# Patient Record
Sex: MALE | Race: WHITE | NOT HISPANIC OR LATINO | Employment: UNEMPLOYED | URBAN - METROPOLITAN AREA
[De-identification: names, ages, dates, MRNs, and addresses within clinical notes are randomized per-mention and may not be internally consistent; named-entity substitution may affect disease eponyms.]

---

## 2023-08-18 ENCOUNTER — HOSPITAL ENCOUNTER (EMERGENCY)
Facility: HOSPITAL | Age: 23
Discharge: HOME | End: 2023-08-18
Attending: FAMILY MEDICINE | Admitting: FAMILY MEDICINE
Payer: COMMERCIAL

## 2023-08-18 VITALS
TEMPERATURE: 98.4 F | OXYGEN SATURATION: 97 % | HEART RATE: 86 BPM | SYSTOLIC BLOOD PRESSURE: 113 MMHG | WEIGHT: 170 LBS | DIASTOLIC BLOOD PRESSURE: 53 MMHG | RESPIRATION RATE: 19 BRPM

## 2023-08-18 DIAGNOSIS — R11.2 NAUSEA VOMITING AND DIARRHEA: ICD-10-CM

## 2023-08-18 DIAGNOSIS — R19.7 NAUSEA VOMITING AND DIARRHEA: ICD-10-CM

## 2023-08-18 DIAGNOSIS — F10.20 UNCOMPLICATED ALCOHOL DEPENDENCE (H): ICD-10-CM

## 2023-08-18 LAB
ALBUMIN SERPL BCG-MCNC: 4.8 G/DL (ref 3.5–5.2)
ALP SERPL-CCNC: 91 U/L (ref 40–129)
ALT SERPL W P-5'-P-CCNC: 71 U/L (ref 0–70)
ANION GAP SERPL CALCULATED.3IONS-SCNC: 16 MMOL/L (ref 7–15)
AST SERPL W P-5'-P-CCNC: 46 U/L (ref 0–45)
B-OH-BUTYR SERPL-SCNC: 1.2 MMOL/L
BASE EXCESS BLDV CALC-SCNC: 3.5 MMOL/L
BASOPHILS # BLD AUTO: 0 10E3/UL (ref 0–0.2)
BASOPHILS NFR BLD AUTO: 0 %
BILIRUB DIRECT SERPL-MCNC: <0.2 MG/DL (ref 0–0.3)
BILIRUB SERPL-MCNC: 0.6 MG/DL
BUN SERPL-MCNC: 19.3 MG/DL (ref 6–20)
CALCIUM SERPL-MCNC: 9.4 MG/DL (ref 8.6–10)
CHLORIDE SERPL-SCNC: 96 MMOL/L (ref 98–107)
CREAT SERPL-MCNC: 0.74 MG/DL (ref 0.67–1.17)
DEPRECATED HCO3 PLAS-SCNC: 25 MMOL/L (ref 22–29)
EOSINOPHIL # BLD AUTO: 0 10E3/UL (ref 0–0.7)
EOSINOPHIL NFR BLD AUTO: 0 %
ERYTHROCYTE [DISTWIDTH] IN BLOOD BY AUTOMATED COUNT: 11.7 % (ref 10–15)
ETHANOL SERPL-MCNC: <0.01 G/DL
GFR SERPL CREATININE-BSD FRML MDRD: >90 ML/MIN/1.73M2
GLUCOSE SERPL-MCNC: 116 MG/DL (ref 70–99)
HCO3 BLDV-SCNC: 28 MMOL/L (ref 24–30)
HCT VFR BLD AUTO: 39.3 % (ref 40–53)
HGB BLD-MCNC: 13.7 G/DL (ref 13.3–17.7)
IMM GRANULOCYTES # BLD: 0 10E3/UL
IMM GRANULOCYTES NFR BLD: 0 %
LIPASE SERPL-CCNC: 16 U/L (ref 13–60)
LYMPHOCYTES # BLD AUTO: 0.6 10E3/UL (ref 0.8–5.3)
LYMPHOCYTES NFR BLD AUTO: 7 %
MCH RBC QN AUTO: 30.9 PG (ref 26.5–33)
MCHC RBC AUTO-ENTMCNC: 34.9 G/DL (ref 31.5–36.5)
MCV RBC AUTO: 89 FL (ref 78–100)
MONOCYTES # BLD AUTO: 0.5 10E3/UL (ref 0–1.3)
MONOCYTES NFR BLD AUTO: 6 %
NEUTROPHILS # BLD AUTO: 7.4 10E3/UL (ref 1.6–8.3)
NEUTROPHILS NFR BLD AUTO: 87 %
NRBC # BLD AUTO: 0 10E3/UL
NRBC BLD AUTO-RTO: 0 /100
OXYHGB MFR BLDV: 84.1 % (ref 70–75)
PCO2 BLDV: 40 MM HG (ref 35–50)
PH BLDV: 7.44 [PH] (ref 7.35–7.45)
PLATELET # BLD AUTO: 234 10E3/UL (ref 150–450)
PO2 BLDV: 48 MM HG (ref 25–47)
POTASSIUM SERPL-SCNC: 3.7 MMOL/L (ref 3.4–5.3)
PROT SERPL-MCNC: 7.6 G/DL (ref 6.4–8.3)
RBC # BLD AUTO: 4.43 10E6/UL (ref 4.4–5.9)
SAO2 % BLDV: 84.9 % (ref 70–75)
SODIUM SERPL-SCNC: 137 MMOL/L (ref 136–145)
WBC # BLD AUTO: 8.5 10E3/UL (ref 4–11)

## 2023-08-18 PROCEDURE — 250N000011 HC RX IP 250 OP 636: Mod: JZ | Performed by: FAMILY MEDICINE

## 2023-08-18 PROCEDURE — 258N000003 HC RX IP 258 OP 636: Performed by: FAMILY MEDICINE

## 2023-08-18 PROCEDURE — 83930 ASSAY OF BLOOD OSMOLALITY: CPT | Performed by: FAMILY MEDICINE

## 2023-08-18 PROCEDURE — 82077 ASSAY SPEC XCP UR&BREATH IA: CPT | Performed by: FAMILY MEDICINE

## 2023-08-18 PROCEDURE — 83690 ASSAY OF LIPASE: CPT | Performed by: FAMILY MEDICINE

## 2023-08-18 PROCEDURE — 96374 THER/PROPH/DIAG INJ IV PUSH: CPT

## 2023-08-18 PROCEDURE — 96361 HYDRATE IV INFUSION ADD-ON: CPT

## 2023-08-18 PROCEDURE — 36415 COLL VENOUS BLD VENIPUNCTURE: CPT | Performed by: FAMILY MEDICINE

## 2023-08-18 PROCEDURE — 82248 BILIRUBIN DIRECT: CPT | Performed by: FAMILY MEDICINE

## 2023-08-18 PROCEDURE — 82805 BLOOD GASES W/O2 SATURATION: CPT | Performed by: FAMILY MEDICINE

## 2023-08-18 PROCEDURE — 85025 COMPLETE CBC W/AUTO DIFF WBC: CPT | Performed by: FAMILY MEDICINE

## 2023-08-18 PROCEDURE — 99284 EMERGENCY DEPT VISIT MOD MDM: CPT | Mod: 25

## 2023-08-18 PROCEDURE — C9113 INJ PANTOPRAZOLE SODIUM, VIA: HCPCS | Mod: JZ | Performed by: FAMILY MEDICINE

## 2023-08-18 PROCEDURE — 96375 TX/PRO/DX INJ NEW DRUG ADDON: CPT

## 2023-08-18 PROCEDURE — 93005 ELECTROCARDIOGRAM TRACING: CPT | Performed by: FAMILY MEDICINE

## 2023-08-18 PROCEDURE — 82010 KETONE BODYS QUAN: CPT | Performed by: FAMILY MEDICINE

## 2023-08-18 RX ORDER — ONDANSETRON 2 MG/ML
4 INJECTION INTRAMUSCULAR; INTRAVENOUS ONCE
Status: COMPLETED | OUTPATIENT
Start: 2023-08-18 | End: 2023-08-18

## 2023-08-18 RX ADMIN — ONDANSETRON 4 MG: 2 INJECTION INTRAMUSCULAR; INTRAVENOUS at 22:55

## 2023-08-18 RX ADMIN — SODIUM CHLORIDE 1000 ML: 9 INJECTION, SOLUTION INTRAVENOUS at 22:55

## 2023-08-18 RX ADMIN — PANTOPRAZOLE SODIUM 40 MG: 40 INJECTION, POWDER, FOR SOLUTION INTRAVENOUS at 23:26

## 2023-08-18 ASSESSMENT — ACTIVITIES OF DAILY LIVING (ADL): ADLS_ACUITY_SCORE: 35

## 2023-08-18 ASSESSMENT — ENCOUNTER SYMPTOMS
VOMITING: 1
DIARRHEA: 1
ABDOMINAL PAIN: 1
NAUSEA: 1

## 2023-08-19 LAB — OSMOLALITY SERPL: 282 MMOL/KG (ref 275–295)

## 2023-08-19 NOTE — ED PROVIDER NOTES
EMERGENCY DEPARTMENT ENCOUNTER      NAME: Alex Carvajal  AGE: 23 year old male  YOB: 2000  MRN: [id number]  EVALUATION DATE & TIME: 8/18/2023  9:47 PM    PCP: No primary care provider on file.    ED PROVIDER: Bola Bernardo M.D.    Chief Complaint   Patient presents with    Nausea, Vomiting, & Diarrhea       FINAL IMPRESSION:  1. Nausea vomiting and diarrhea    2. Uncomplicated alcohol dependence (H)        ED COURSE & MEDICAL DECISION MAKING:    Pertinent Labs & Imaging studies independently interpreted by me. (See chart for details)  9:56 PM  Patient seen and examined, reviewed prior visit of earlier today at outside facility for same symptoms.  At that time patient was discharged with capsaicin cream as well as Zofran, at that time no labs were done and patient was treated symptomatically.  Patient represents tonight with vomiting again this evening.  Concern for possible withdrawal although last drink was over 2 hours ago.  He is tachycardic on initial arrival, blood pressure reassuring.  Mild diffuse abdominal tenderness.  No tremor, tongue fasciculations, or diaphoresis.  Labs and fluids are ordered as well as Zofran.  11:09 PM labs independently interpreted by me with reassuring basic panel, normal venous blood gas, slight elevation of the ketones likely related to vomiting and dehydration, unlikely to be related to isopropyl alcohol ingestion.  Slight elevation in the LFTs consistent with chronic alcohol dependence but alcohol level today is negative.  Patient reports his last drink was 3 days ago, no evidence for delirium tremens or severe alcohol withdrawal, diarrhea overall likely related to gastrointestinal illness.  No severe ketonemia, no evidence for acute intoxication, acute isopropyl alcohol intoxication is unlikely.    At the conclusion of the encounter I discussed the results of all of the tests and the disposition. The questions were answered. The patient or family  acknowledged understanding and was agreeable with the care plan.     Medical Decision Making    History:  Supplemental history from: Documented in chart, if applicable  External Record(s) reviewed: Documented in chart, if applicable. and Other: ED visit from earlier today    Work Up:  Chart documentation includes differential considered and any EKGs or imaging independently interpreted by provider, where specified.  In additional to work up documented, I considered the following work up: Documented in chart, if applicable.    External consultation:  Discussion of management with another provider: Documented in chart, if applicable    Complicating factors:  Care impacted by chronic illness: Mental Health and Smoking / Nicotine Use  Care affected by social determinants of health: Alcohol Abuse and/or Recreational Drug Use    Disposition considerations: Discharge. I recommended the patient continue their current prescription strength medication(s): Zofran 4 mg, can take every 6 hours. See documentation for any additional details.      MEDICATIONS GIVEN IN THE EMERGENCY:  Medications   0.9% sodium chloride BOLUS (1,000 mLs Intravenous $New Bag 8/18/23 2255)   pantoprazole (PROTONIX) IV push injection 40 mg (has no administration in time range)   ondansetron (ZOFRAN) injection 4 mg (4 mg Intravenous $Given 8/18/23 2255)       NEW PRESCRIPTIONS STARTED AT TODAY'S ER VISIT  New Prescriptions    No medications on file       =================================================================    HPI    Patient information was obtained from: Patient       Alex Carvajal is a 23 year old male with a pertinent history of asthma, anxiety, depression, alcohol use, and marijuana use who presents to this ED via walk-in for evaluation of nausea, vomiting, diarrhea     Per chart review: The patient was seen at [facility] earlier today for nausea, vomiting, diarrhea. The patient reported daily marijuana use and that he recently  "checked into a marijuana treatment facility so he has not used it recently. Patient discharged with prescription for capsaicin cream and Zofran.     The patient reports \"a lot of nausea and really consistent vomiting\" today since around 9 AM. The patient reports he has only been able to drink water and Sprite. The patient was seen earlier today (see chart review) and was feeling better with capsaicin cream and Zofran, but was told to come back to the ED by staff at his sober facility wanted him to be seen again. The patient also reports diarrhea, but notes it resolved this evening. The patient also reports some diffuse abdominal pain from vomiting. The patient denies any other symptoms or complaints.     The patient notes that he has been drinking alcohol more frequently lately and will mix isopropyl alcohol with water and drink that when he is having a hard day. The patient notes he last drank on the 15th.     REVIEW OF SYSTEMS   Review of Systems   Gastrointestinal:  Positive for abdominal pain, diarrhea, nausea and vomiting.   All other systems reviewed and are negative.     All other systems reviewed and negative    PAST MEDICAL HISTORY:  No past medical history on file.    PAST SURGICAL HISTORY:  No past surgical history on file.    CURRENT MEDICATIONS:    Current Facility-Administered Medications   Medication    0.9% sodium chloride BOLUS    pantoprazole (PROTONIX) IV push injection 40 mg     No current outpatient medications on file.       ALLERGIES:  No Known Allergies    FAMILY HISTORY:  No family history on file.    SOCIAL HISTORY:   Social History     Socioeconomic History    Marital status: Single       VITALS:  /54   Pulse 68   Temp 98.4  F (36.9  C) (Oral)   Resp 22   Wt 77.1 kg (170 lb)   SpO2 96%     PHYSICAL EXAM:  Physical Exam  Vitals and nursing note reviewed.   Constitutional:       Appearance: Normal appearance.   HENT:      Head: Normocephalic and atraumatic.      Right Ear: " External ear normal.      Left Ear: External ear normal.      Nose: Nose normal.      Mouth/Throat:      Mouth: Mucous membranes are moist.   Eyes:      Extraocular Movements: Extraocular movements intact.      Conjunctiva/sclera: Conjunctivae normal.      Pupils: Pupils are equal, round, and reactive to light.   Cardiovascular:      Rate and Rhythm: Normal rate and regular rhythm.   Pulmonary:      Effort: Pulmonary effort is normal.      Breath sounds: Normal breath sounds. No wheezing or rales.   Abdominal:      General: Abdomen is flat. There is no distension.      Palpations: Abdomen is soft.      Tenderness: There is abdominal tenderness (mild, diffuse). There is no guarding.   Musculoskeletal:         General: Normal range of motion.      Cervical back: Normal range of motion and neck supple.      Right lower leg: No edema.      Left lower leg: No edema.   Lymphadenopathy:      Cervical: No cervical adenopathy.   Skin:     General: Skin is warm and dry.   Neurological:      General: No focal deficit present.      Mental Status: He is alert and oriented to person, place, and time. Mental status is at baseline.      Comments: No gross focal neurologic deficits   Psychiatric:         Mood and Affect: Mood normal.         Behavior: Behavior normal.         Thought Content: Thought content normal.          LAB:  All pertinent labs reviewed and interpreted.  Results for orders placed or performed during the hospital encounter of 08/18/23   Basic metabolic panel   Result Value Ref Range    Sodium 137 136 - 145 mmol/L    Potassium 3.7 3.4 - 5.3 mmol/L    Chloride 96 (L) 98 - 107 mmol/L    Carbon Dioxide (CO2) 25 22 - 29 mmol/L    Anion Gap 16 (H) 7 - 15 mmol/L    Urea Nitrogen 19.3 6.0 - 20.0 mg/dL    Creatinine 0.74 0.67 - 1.17 mg/dL    Calcium 9.4 8.6 - 10.0 mg/dL    Glucose 116 (H) 70 - 99 mg/dL    GFR Estimate >90 >60 mL/min/1.73m2   Hepatic function panel   Result Value Ref Range    Protein Total 7.6 6.4 - 8.3  g/dL    Albumin 4.8 3.5 - 5.2 g/dL    Bilirubin Total 0.6 <=1.2 mg/dL    Alkaline Phosphatase 91 40 - 129 U/L    AST 46 (H) 0 - 45 U/L    ALT 71 (H) 0 - 70 U/L    Bilirubin Direct <0.20 0.00 - 0.30 mg/dL   Result Value Ref Range    Lipase 16 13 - 60 U/L   Ethyl Alcohol Level   Result Value Ref Range    Alcohol ethyl <0.01 <=0.01 g/dL   Blood gas venous   Result Value Ref Range    pH Venous 7.44 7.35 - 7.45    pCO2 Venous 40 35 - 50 mm Hg    pO2 Venous 48 (H) 25 - 47 mm Hg    Bicarbonate Venous 28 24 - 30 mmol/L    Base Excess/Deficit (+/-) 3.5   mmol/L    Oxyhemoglobin Venous 84.1 (H) 70.0 - 75.0 %    O2 Sat, Venous 84.9 (H) 70.0 - 75.0 %   Ketone Beta-Hydroxybutyrate Quantitative   Result Value Ref Range    Ketone (Beta-Hydroxybutyrate) Quantitative 1.20 (H) <=0.30 mmol/L   CBC with platelets and differential   Result Value Ref Range    WBC Count 8.5 4.0 - 11.0 10e3/uL    RBC Count 4.43 4.40 - 5.90 10e6/uL    Hemoglobin 13.7 13.3 - 17.7 g/dL    Hematocrit 39.3 (L) 40.0 - 53.0 %    MCV 89 78 - 100 fL    MCH 30.9 26.5 - 33.0 pg    MCHC 34.9 31.5 - 36.5 g/dL    RDW 11.7 10.0 - 15.0 %    Platelet Count 234 150 - 450 10e3/uL    % Neutrophils 87 %    % Lymphocytes 7 %    % Monocytes 6 %    % Eosinophils 0 %    % Basophils 0 %    % Immature Granulocytes 0 %    NRBCs per 100 WBC 0 <1 /100    Absolute Neutrophils 7.4 1.6 - 8.3 10e3/uL    Absolute Lymphocytes 0.6 (L) 0.8 - 5.3 10e3/uL    Absolute Monocytes 0.5 0.0 - 1.3 10e3/uL    Absolute Eosinophils 0.0 0.0 - 0.7 10e3/uL    Absolute Basophils 0.0 0.0 - 0.2 10e3/uL    Absolute Immature Granulocytes 0.0 <=0.4 10e3/uL    Absolute NRBCs 0.0 10e3/uL       RADIOLOGY:  Reviewed all pertinent imaging. Please see official radiology report.  No orders to display         Bola Bernardo M.D.  Emergency Medicine  [facility] EMERGENCY DEPARTMENT  [address] [place] [phone]  Dept: [phone]        Bola Bernardo MD  08/18/23 0173

## 2023-08-19 NOTE — ED TRIAGE NOTES
Pt c/o nausea that started from 9am to 6pm, vomiting every 15 min since 930am to 4pm, diarrhea from 930a to 1pm. Pt able to keep water in. Per pt he smokes marijuana everyday, vap 1/3 of a gram of cannabis wax, last use was yesterday. Pt last alcohol drink was 10pm on 8/15. When pt can not get alcohol for a drink he adds water to rubbing alcohol and drink it. Per pt he drinks alcohol 4 days a week and smoke marijuana 6 days a week. Pt is living in a [facility] [place] [facility]. Pt is here with a staff from the residency.     Triage Assessment       Row Name 08/18/23 7134       Triage Assessment (Adult)    Airway WDL WDL       Respiratory WDL    Respiratory WDL WDL       Skin Circulation/Temperature WDL    Skin Circulation/Temperature WDL WDL       Cardiac WDL    Cardiac WDL WDL       Peripheral/Neurovascular WDL    Peripheral Neurovascular WDL WDL       Cognitive/Neuro/Behavioral WDL    Cognitive/Neuro/Behavioral WDL WDL

## 2023-08-21 LAB
ATRIAL RATE - MUSE: 68 BPM
DIASTOLIC BLOOD PRESSURE - MUSE: NORMAL MMHG
INTERPRETATION ECG - MUSE: NORMAL
P AXIS - MUSE: 67 DEGREES
PR INTERVAL - MUSE: 132 MS
QRS DURATION - MUSE: 100 MS
QT - MUSE: 404 MS
QTC - MUSE: 429 MS
R AXIS - MUSE: 61 DEGREES
SYSTOLIC BLOOD PRESSURE - MUSE: NORMAL MMHG
T AXIS - MUSE: 46 DEGREES
VENTRICULAR RATE- MUSE: 68 BPM

## 2023-11-11 ENCOUNTER — HEALTH MAINTENANCE LETTER (OUTPATIENT)
Age: 23
End: 2023-11-11

## 2024-01-31 ENCOUNTER — HOSPITAL ENCOUNTER (EMERGENCY)
Facility: CLINIC | Age: 24
Discharge: LEFT BEFORE DISCHARGE | End: 2024-01-31
Admitting: EMERGENCY MEDICINE
Payer: COMMERCIAL

## 2024-01-31 VITALS
SYSTOLIC BLOOD PRESSURE: 124 MMHG | HEART RATE: 116 BPM | TEMPERATURE: 98.5 F | WEIGHT: 185 LBS | OXYGEN SATURATION: 99 % | HEIGHT: 67 IN | BODY MASS INDEX: 29.03 KG/M2 | RESPIRATION RATE: 22 BRPM | DIASTOLIC BLOOD PRESSURE: 71 MMHG

## 2024-01-31 LAB
ALBUMIN SERPL BCG-MCNC: 5.3 G/DL (ref 3.5–5.2)
ALP SERPL-CCNC: 109 U/L (ref 40–150)
ALT SERPL W P-5'-P-CCNC: 33 U/L (ref 0–70)
ANION GAP SERPL CALCULATED.3IONS-SCNC: 19 MMOL/L (ref 7–15)
AST SERPL W P-5'-P-CCNC: 23 U/L (ref 0–45)
BASOPHILS # BLD AUTO: ABNORMAL 10*3/UL
BASOPHILS # BLD MANUAL: 0 10E3/UL (ref 0–0.2)
BASOPHILS NFR BLD AUTO: ABNORMAL %
BASOPHILS NFR BLD MANUAL: 0 %
BILIRUB SERPL-MCNC: 0.4 MG/DL
BUN SERPL-MCNC: 15 MG/DL (ref 6–20)
CALCIUM SERPL-MCNC: 10.3 MG/DL (ref 8.6–10)
CHLORIDE SERPL-SCNC: 100 MMOL/L (ref 98–107)
CREAT SERPL-MCNC: 1.05 MG/DL (ref 0.67–1.17)
DEPRECATED HCO3 PLAS-SCNC: 23 MMOL/L (ref 22–29)
EGFRCR SERPLBLD CKD-EPI 2021: >90 ML/MIN/1.73M2
EOSINOPHIL # BLD AUTO: ABNORMAL 10*3/UL
EOSINOPHIL # BLD MANUAL: 0 10E3/UL (ref 0–0.7)
EOSINOPHIL NFR BLD AUTO: ABNORMAL %
EOSINOPHIL NFR BLD MANUAL: 0 %
ERYTHROCYTE [DISTWIDTH] IN BLOOD BY AUTOMATED COUNT: 12.2 % (ref 10–15)
GLUCOSE SERPL-MCNC: 170 MG/DL (ref 70–99)
HCT VFR BLD AUTO: 45.4 % (ref 40–53)
HGB BLD-MCNC: 15.7 G/DL (ref 13.3–17.7)
IMM GRANULOCYTES # BLD: ABNORMAL 10*3/UL
IMM GRANULOCYTES NFR BLD: ABNORMAL %
LIPASE SERPL-CCNC: 11 U/L (ref 13–60)
LYMPHOCYTES # BLD AUTO: ABNORMAL 10*3/UL
LYMPHOCYTES # BLD MANUAL: 0.9 10E3/UL (ref 0.8–5.3)
LYMPHOCYTES NFR BLD AUTO: ABNORMAL %
LYMPHOCYTES NFR BLD MANUAL: 4 %
MCH RBC QN AUTO: 30.4 PG (ref 26.5–33)
MCHC RBC AUTO-ENTMCNC: 34.6 G/DL (ref 31.5–36.5)
MCV RBC AUTO: 88 FL (ref 78–100)
MONOCYTES # BLD AUTO: ABNORMAL 10*3/UL
MONOCYTES # BLD MANUAL: 1.3 10E3/UL (ref 0–1.3)
MONOCYTES NFR BLD AUTO: ABNORMAL %
MONOCYTES NFR BLD MANUAL: 6 %
NEUTROPHILS # BLD AUTO: ABNORMAL 10*3/UL
NEUTROPHILS # BLD MANUAL: 20 10E3/UL (ref 1.6–8.3)
NEUTROPHILS NFR BLD AUTO: ABNORMAL %
NEUTROPHILS NFR BLD MANUAL: 90 %
NRBC # BLD AUTO: 0 10E3/UL
NRBC BLD AUTO-RTO: 0 /100
PLAT MORPH BLD: ABNORMAL
PLATELET # BLD AUTO: 404 10E3/UL (ref 150–450)
POTASSIUM SERPL-SCNC: 3.9 MMOL/L (ref 3.4–5.3)
PROT SERPL-MCNC: 9.1 G/DL (ref 6.4–8.3)
RBC # BLD AUTO: 5.16 10E6/UL (ref 4.4–5.9)
RBC MORPH BLD: ABNORMAL
SODIUM SERPL-SCNC: 142 MMOL/L (ref 135–145)
WBC # BLD AUTO: 22.2 10E3/UL (ref 4–11)

## 2024-01-31 PROCEDURE — 85027 COMPLETE CBC AUTOMATED: CPT | Performed by: EMERGENCY MEDICINE

## 2024-01-31 PROCEDURE — 83690 ASSAY OF LIPASE: CPT | Performed by: EMERGENCY MEDICINE

## 2024-01-31 PROCEDURE — 82040 ASSAY OF SERUM ALBUMIN: CPT | Performed by: EMERGENCY MEDICINE

## 2024-01-31 PROCEDURE — 99281 EMR DPT VST MAYX REQ PHY/QHP: CPT | Mod: 25

## 2024-01-31 PROCEDURE — 36415 COLL VENOUS BLD VENIPUNCTURE: CPT | Performed by: EMERGENCY MEDICINE

## 2024-01-31 PROCEDURE — 250N000011 HC RX IP 250 OP 636: Performed by: EMERGENCY MEDICINE

## 2024-01-31 PROCEDURE — 258N000003 HC RX IP 258 OP 636: Performed by: EMERGENCY MEDICINE

## 2024-01-31 PROCEDURE — 96374 THER/PROPH/DIAG INJ IV PUSH: CPT

## 2024-01-31 PROCEDURE — 85007 BL SMEAR W/DIFF WBC COUNT: CPT | Performed by: EMERGENCY MEDICINE

## 2024-01-31 RX ORDER — ONDANSETRON 2 MG/ML
4 INJECTION INTRAMUSCULAR; INTRAVENOUS ONCE
Status: COMPLETED | OUTPATIENT
Start: 2024-01-31 | End: 2024-01-31

## 2024-01-31 RX ADMIN — ONDANSETRON 4 MG: 2 INJECTION INTRAMUSCULAR; INTRAVENOUS at 17:40

## 2024-01-31 RX ADMIN — SODIUM CHLORIDE 1000 ML: 9 INJECTION, SOLUTION INTRAVENOUS at 17:40

## 2024-01-31 ASSESSMENT — ACTIVITIES OF DAILY LIVING (ADL): ADLS_ACUITY_SCORE: 35

## 2024-01-31 NOTE — ED TRIAGE NOTES
Had a vomiting episode last night at midnight then started vomiting this morning and hasn't stopped.  Here with dad who states seems his vomit looks like its dark or blood tinged.  Pt denies alcohol use.  States occ uses ibuprofen.    Reports gen ab pain started after the vomiting.   States does use cannabis and has had this before.  Vaped yesterday afternoon.      Triage Assessment (Adult)       Row Name 01/31/24 8525          Triage Assessment    Airway WDL WDL        Respiratory WDL    Respiratory WDL WDL        Skin Circulation/Temperature WDL    Skin Circulation/Temperature WDL X        Cardiac WDL    Cardiac WDL WDL        Peripheral/Neurovascular WDL    Peripheral Neurovascular WDL WDL        Cognitive/Neuro/Behavioral WDL    Cognitive/Neuro/Behavioral WDL WDL

## 2024-02-21 ENCOUNTER — HOSPITAL ENCOUNTER (EMERGENCY)
Facility: CLINIC | Age: 24
Discharge: HOME | End: 2024-02-21
Attending: EMERGENCY MEDICINE | Admitting: EMERGENCY MEDICINE
Payer: COMMERCIAL

## 2024-02-21 VITALS
DIASTOLIC BLOOD PRESSURE: 45 MMHG | HEART RATE: 76 BPM | HEIGHT: 67 IN | TEMPERATURE: 98.6 F | OXYGEN SATURATION: 100 % | RESPIRATION RATE: 16 BRPM | SYSTOLIC BLOOD PRESSURE: 128 MMHG | WEIGHT: 188 LBS | BODY MASS INDEX: 29.51 KG/M2

## 2024-02-21 DIAGNOSIS — F41.9 ANXIETY: ICD-10-CM

## 2024-02-21 PROBLEM — F33.1 MAJOR DEPRESSIVE DISORDER, RECURRENT EPISODE, MODERATE (H): Status: ACTIVE | Noted: 2024-02-21

## 2024-02-21 PROBLEM — F41.1 GENERALIZED ANXIETY DISORDER: Status: ACTIVE | Noted: 2024-02-21

## 2024-02-21 PROCEDURE — 99282 EMERGENCY DEPT VISIT SF MDM: CPT

## 2024-02-21 ASSESSMENT — COLUMBIA-SUICIDE SEVERITY RATING SCALE - C-SSRS
6. HAVE YOU EVER DONE ANYTHING, STARTED TO DO ANYTHING, OR PREPARED TO DO ANYTHING TO END YOUR LIFE?: NO
1. IN THE PAST MONTH, HAVE YOU WISHED YOU WERE DEAD OR WISHED YOU COULD GO TO SLEEP AND NOT WAKE UP?: YES
2. HAVE YOU ACTUALLY HAD ANY THOUGHTS OF KILLING YOURSELF IN THE PAST MONTH?: NO

## 2024-02-21 ASSESSMENT — ACTIVITIES OF DAILY LIVING (ADL)
ADLS_ACUITY_SCORE: 35
ADLS_ACUITY_SCORE: 35

## 2024-02-21 NOTE — ED TRIAGE NOTES
"\"Physical anxiety but better since on selective serotonin reuptake inhibitor, however he feels he needs something strong than what he's getting\"        "

## 2024-02-21 NOTE — ED NOTES
"On assessment pt stated to writer \"My anxiety has been worse the past couple days with mor like somatic symptoms.  I called the doctor for a prescription for clonidine and it worked well for me.  They wouldn't do it. My goal is to get medication to help.\" Denies SI or HI  "

## 2024-02-21 NOTE — DISCHARGE INSTRUCTIONS
If I am feeling unsafe or I am in a c risis, I will:   Contact my established care providers   Call the National Suicide Prevention Lifeline: [phone]   Go to the nearest emergency room   Call 911          Warning signs that I or other people might notice when a crisis is developing for me: My anxiety is becoming more unmanageable.    Things I am able to do on my own to cope or help me feel better: Engage in relaxation techniques: deep/square breathing exercises, visulalization.    Things that I am able to do with others to cope or help me better: Spend quality time with family and friends.     Things I can use or do for distraction: Movies, TV, music, hobbies, games...    Changes I can make to support my mental health and wellness: Engage in provider suggested anxiety management to augment the medications you are prescribed.    People in my life that I can ask for help: Parents, providers.    Your [facility] has a mental health crisis team you can call 24/7: [facility]: [phone]    Other things that are important when I m in crisis: I am not alone, I have resources, just ask!!!![facility] SCHEDULING:  Today you were seen by a licensed mental health professional through [facility] and [facility] ([facility])  for a crisis assessment in the Emergency Department at [facility].  It is recommended that you follow up with your estabished providers (psychiatrist, mental health therapist, and/or primary care doctor - as relevant) as soon as possible. Coordinators from [facility] will be calling you in the next 24-48 hours to ensure that you have the resources you need.  You can also contact [facility] coordinators directly at [phone].    You have been scheduled the following appointments:     Date: Tuesday, 2/27/2024  Time: 2:30 pm - 3:30 pm  Provider: Kamille JIMENEZ  CNP,RN  Location: [facility], [address] [place] [address], [facility] [address], [place] [address]  Phone: [phone]  Type: Medication Mgmt - Initial (In-Person)    Scheduling Instructions  NO VIRTUAL FOR AGES UNDER 18 EMAIL IS REQUIRED TO SCHEDULE FOR ALL APPTS NEW PATIENT PAPERWORK WILL NEED TO BE COMPLETED & RETURNED 24HRS IN ADVANCE. CORRECT INSURANCE AND PHONE NUMBER TO SCHEDULE AN APPT. Kamille accepts ages 15yrs-72yrs- NO THERAPY!!! NO EXCEPTIONS!!! NO DEMENTIA, ALZHEIMER'S OR PARKINSON'S! Patients must live in [place].  Patient Instructions  NO VIRTUAL FOR AGES UNDER 18 EMAIL IS REQUIRED TO SCHEDULE FOR ALL APPTS NEW PATIENT PAPERWORK WILL NEED TO BE COMPLETED & RETURNED 24HRS IN ADVANCE. CORRECT INSURANCE AND PHONE NUMBER TO SCHEDULE AN APPT. Kamille accepts ages 15yrs-72yrs- NO THERAPY!!! NO EXCEPTIONS!!! NO DEMENTIA, ALZHEIMER'S OR PARKINSON'S! Patients must live in [place].      [facility] maintains an extensive network of licensed behavioral health providers to connect patients with the services they need.  We do not charge providers a fee to participate in our referral network.  We match patients with providers based on a patient s specific needs, insurance coverage, and location.  Our first effort will be to refer you to a provider within your care system, and will utilize providers outside your care system as needed.

## 2024-02-21 NOTE — ED PROVIDER NOTES
"  History     Chief Complaint:  Anxiety       HPI   Alex Carvajal is a 23 year old male who presents for evaluation of anxiety. He felt physical anxiety symptoms of shortness of breath and chest tightness, which is typical for him to experience several times a week. His psychiatrist recently reduced the dosages of some of his medications, and he is unsure if this is causing this. His primary request is having \"heavier\" medications prescribed such as benzo's or stimulants, as he is 21 days sober from THC and 3.5 months sober from alcohol, and fears without these stronger medications he will relapse. He requested this to be done by his psychiatrist who declined. He denies any physical complaints.      Independent Historian:   None - Patient Only    Review of External Notes:   I reviewed the PDMP.  The patient receives regular prescriptions for gabapentin.  No other narcotic prescriptions were seen in the database over the last 12 months.    Medications:    Acetaminophen  Albuterol inhaled  Aripiprazole  Capsaicin  cyclobenzaprine  Diclofenac  Fluorouracil  Fluticasone inhaled  Gabapentin  Guaifenesin ER  Hydroxyzine  Ibuprofen   lidocaine ointment  Melatonin  Montelukast  Naltrexone  Naproxen  Ondansetron  Propranolol  Quetiapine  Salicylic acid liquid  Sertraline  Sumatriptan  Terbinafine  Tizanidine    Past Medical History:    Anxiety    Past Surgical History:    No past surgical history on file.     Physical Exam     Patient Vitals for the past 24 hrs:   BP Temp Temp src Pulse Resp SpO2 Height Weight   02/21/24 1116 128/45 98.6  F (37  C) Temporal 76 16 100 % 1.702 m (5' 7\") 85.3 kg (188 lb)        Physical Exam  General: Alert, No distress. Nontoxic appearance  Head: No signs of trauma.   Mouth/Throat: Oropharynx moist.   Eyes: Conjunctivae are normal. Pupils are equal..   Neck: Normal range of motion.    CV: Appears well perfused.  Resp:No respiratory distress.   MSK: Normal range of motion. No obvious " deformity.   Neuro: The patient is alert and interactive. VILLARREAL. Speech normal. GCS 15  Skin: No lesion or sign of trauma noted.   Psych: normal mood and affect. behavior is normal.       Emergency Department Course     Emergency Department Course & Assessments:    Interventions:  Medications - No data to display     Independent Interpretation (X-rays, CTs, rhythm strip):  None    Assessments/Consultations/Discussion of Management or Tests:  I consulted with the DEC .  See their notes for details.    Social Determinants of Health affecting care:   Healthcare Access/Compliance and Stress/Adjustment Disorders    Disposition:  The patient was discharged to home.     Impression & Plan        Medical Decision Making:  Alex Carvajal is a 23 year old male who presents for evaluation of anxiety.  There is a history of anxiety in the past and they are on medications.    There is no signs at this point of a general medical problem causing anxiety (PE, hyperthyroidism, other metabolic derangement, infection, etc).  There are no signs of serious decompensation warranting psychiatric hospitalization or 72 hold (no suicidal or homicidal ideation, no danger to self).    The patient was evaluated by the DEC staff.  It is determined that the patient does not need his medications adjusted and the way he would like.  The patient had been evaluated by his primary psychiatrist earlier in the day.  He did not receive the medications he would like to have started.  He should not be started on benzodiazepines or stimulants from the ER.    He was given a referral by the DEC staff to obtain an outpatient review of his medications.    Diagnosis:    ICD-10-CM    1. Anxiety  F41.9            Discharge Medications:  New Prescriptions    No medications on file          Scribe Disclosure:  Harrison CABRALES, am serving as a scribe at 1:01 PM on 2/21/2024 to document services personally performed by Carlton Del Castillo MD based on  my observations and the provider's statements to me.        Carlton Del Castillo MD  02/21/24 5093

## 2024-02-22 NOTE — CONSULTS
Diagnostic Evaluation Consultation  Crisis Assessment    Patient Name: Alex Carvajal  Age:  23 year old  Legal Sex: male  Gender Identity: male  Pronouns:   Race: White  Ethnicity: Not  or   Language: English      Patient was assessed: In person      Patient location: [facility] EMERGENCY DEPT                                 Referral Data and Chief Complaint  Alex Carvajal presents to the ED by  self. Patient is presenting to the ED for the following concerns: Depression, Anxiety.   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      Informed Consent and Assessment Methods  Explained the crisis assessment process, including applicable information disclosures and limits to confidentiality, assessed understanding of the process, and obtained consent to proceed with the assessment.  Assessment methods included conducting a formal interview with patient, review of medical records, collaboration with medical staff, and obtaining relevant collateral information from family and community providers when available.  : done     Patient response to interventions: acceptance expressed, verbalizes understanding, eager to participate  Coping skills were attempted to reduce the crisis:  Self advocating.     History of the Crisis   Patient presents to [facility] ED on his own after presenting to [facility] this morning seeking Cloidine and Klonazapam for anxiety issues. patient has prior diagnosis of MDD and JONATHAN. Patient is endorsing tightness of chest and SOB several times per week lately. Symptoms have increased recently within the past five days. Patient says he is 21 days sober from Cannabis that causes him intractable vomiting, and 3.5 months sober from EtOH. Patient presented to [facility] this morning and met with his provider who declined prescribing. Patient appears to be med seeking and shopping. He  "presented his [facility] attending a list, seeking \"benzo's, stimulants, and hypnotics, as a medical necessity\", and cited the hipocratic oath as motivation to the attending. Patient presented as calm, cooperative, and conversational, and very outcome focused on meds. Patient denies any current SI/SIB/HI/AH/VH. Patient endorses mild insomnia citing 4-5 hours of sleep nightly. Patient was set up to attend  treatment at [facility] but left after 4 days. He is currently on probation for theft. Patient says he has been feeling the worst anxiety while he has been on medications. Patient lives at home with his parents and is currently unemployed. His PCP is through [facility], Dr. Jed Wilson MD. His PsyD at [facility] is Lucien Cali, [address] [place] [address], [place] [address], [phone]. He also has a pain medication provider at [facility] in [email or web address]. [address] [place] [address]. [place] [address]. PAtient is seeking medication management and will be provided an appointment upcoming for this.    Brief Psychosocial History  Family:  Single, Children no  Support System:  Parent(s), Sibling(s)  Employment Status:  unemployed  Source of Income:  none  Financial Environmental Concerns:  none  Current Hobbies:  sports/team sports, television/movies/videos  Barriers in Personal Life:  mental health concerns    Significant Clinical History  Current Anxiety Symptoms:  anxious  Current Depression/Trauma:   (none noted.)  Current Somatic Symptoms:  anxious  Current Psychosis/Thought Disturbance:   (none noted.)  Current Eating Symptoms:  loss of appetite  Chemical Use History:  Alcohol: None  Last Use:: 11/01/23  Benzodiazepines: None  Opiates: None  Cocaine: None  Marijuana: None  Last Use:: 02/01/24  Other Use: None  Withdrawal Symptoms:  (none noted.)  Addictions:  (none noted.)   Past diagnosis:  Anxiety Disorder, Depression  Family history:  No known history of mental " health or chemical health concerns  Past treatment:  Individual therapy, Primary Care, Psychiatric Medication Management, Residential Treatment  Details of most recent treatment:  Attempted IP through [facility]. Lasted 4 days.  Other relevant history:          Collateral Information  Is there collateral information:   No. Patient refused.       Risk Assessment  [place] Suicide Severity Rating Scale Full Clinical Version:  Suicidal Ideation  Q1 Wish to be Dead (Lifetime): No  Q6 Suicide Behavior (Lifetime): no     Suicidal Behavior (Lifetime)  Actual Attempt (Lifetime): No  Has subject engaged in non-suicidal self-injurious behavior? (Lifetime): No  Interrupted Attempts (Lifetime): No  Aborted or Self-Interrupted Attempt (Lifetime): No  Preparatory Acts or Behavior (Lifetime): No    [place] Suicide Severity Rating Scale Recent:   Suicidal Ideation (Recent)  Q1 Wished to be Dead (Past Month): yes  Q2 Suicidal Thoughts (Past Month): no  Level of Risk per Screen: low risk  Intensity of Ideation (Recent)  Description of Most Severe Ideation (Past 1 Month): None noted.  Frequency (Past 1 Month):  (None noted.)  Duration (Past 1 Month):  (None noted.)  Controllability (Past 1 Month): Does not attempt to control thoughts  Deterrents (Past 1 Month): Does not apply  Reasons for Ideation (Past 1 Month): Does not apply  Suicidal Behavior (Recent)  Actual Attempt (Past 3 Months): No  Total Number of Actual Attempts (Past 3 Months): 0  Actual Attempt Description (Past 3 Months): None noted.  Has subject engaged in non-suicidal self-injurious behavior? (Past 3 Months): No  Interrupted Attempts (Past 3 Months): No  Total Number of Interrupted Attempts (Past 3 Months): 0  Interrupted Attempt Description (Past 3 Months): None noted.  Aborted or Self-Interrupted Attempt (Past 3 Months): No  Total Number of Aborted or Self-Interrupted Attempts (Past 3 Months): 0  Aborted or Self-Interrupted Attempt Description (Past 3 Months): None  noted.  Preparatory Acts or Behavior (Past 3 Months): No  Total Number of Preparatory Acts (Past 3 Months): 0  Preparatory Acts or Behavior Description (Past 3 Months): None noted.    Environmental or Psychosocial Events: challenging interpersonal relationships, helplessness/hopelessness  Protective Factors: Protective Factors: lives in a responsibly safe and stable environment, able to access care without barriers, supportive ongoing medical and mental health care relationships, help seeking, constructive use of leisure time, enjoyable activities, resilience    Does the patient have thoughts of harming others? Feels Like Hurting Others: no  Current presentation:  (Calm, cooperative, conversational.)  Is the patient engaging in sexually inappropriate behavior?: no    Is the patient engaging in sexually inappropriate behavior?  no        Mental Status Exam   Affect: Appropriate  Appearance: Appropriate  Attention Span/Concentration: Attentive  Eye Contact: Variable    Fund of Knowledge: Appropriate   Language /Speech Content: Fluent  Language /Speech Volume: Normal  Language /Speech Rate/Productions: Normal  Recent Memory: Intact  Remote Memory: Intact  Mood: Normal  Orientation to Person: Yes   Orientation to Place: Yes  Orientation to Time of Day: Yes  Orientation to Date: Yes     Situation (Do they understand why they are here?): Yes  Psychomotor Behavior: Normal  Thought Content: Clear  Thought Form: Goal Directed (seeking meds)      Medication  Psychotropic medications:   Medication Orders - Psychiatric (From admission, onward)      None             Current Care Team  Patient Care Team:  [facility] as PCP - General    Diagnosis  Patient Active Problem List   Diagnosis Code    Major depressive disorder, recurrent episode, moderate (H) F33.1    Generalized anxiety disorder F41.1       Primary Problem This Admission  Active Hospital Problems    Major depressive disorder, recurrent episode,  moderate (H)      *Generalized anxiety disorder        Clinical Summary and Substantiation of Recommendations   Patient is not suicidal, homicidal, self injurious, and denies any AH/VH. Patient is seeking medications not dispensed through the ED. As such, after consult with attending, patient is discharged with a medication managment appointment next week. Patient declined a therapist at this time and is seeking to discharge.                          Patient coping skills attempted to reduce the crisis:  Self advocating.    Disposition  Recommended disposition: Medication Management        Reviewed case and recommendations with attending provider. Attending Name: Dr. IOANA Del Castillo MD       Attending concurs with disposition: yes       Patient and/or validated legal guardian concurs with disposition:   yes       Final disposition:  discharge    Legal status on admission: Voluntary/Patient has signed consent for treatment    Assessment Details   Total duration spent with the patient: 30 min     CPT code(s) utilized: 85959 - Psychotherapy for Crisis - 60 (30-74*) min    Danilo Francis Psychotherapist  [facility] - Triage & Transition Services  Callback: [phone]

## 2024-02-23 ENCOUNTER — APPOINTMENT (OUTPATIENT)
Dept: GENERAL RADIOLOGY | Facility: CLINIC | Age: 24
End: 2024-02-23
Attending: EMERGENCY MEDICINE
Payer: COMMERCIAL

## 2024-02-23 ENCOUNTER — HOSPITAL ENCOUNTER (EMERGENCY)
Facility: CLINIC | Age: 24
Discharge: HOME | End: 2024-02-23
Attending: EMERGENCY MEDICINE | Admitting: EMERGENCY MEDICINE
Payer: COMMERCIAL

## 2024-02-23 VITALS — OXYGEN SATURATION: 98 % | TEMPERATURE: 98 F | RESPIRATION RATE: 18 BRPM | HEART RATE: 93 BPM

## 2024-02-23 DIAGNOSIS — M25.532 LEFT WRIST PAIN: ICD-10-CM

## 2024-02-23 PROCEDURE — 73110 X-RAY EXAM OF WRIST: CPT | Mod: LT

## 2024-02-23 PROCEDURE — 99284 EMERGENCY DEPT VISIT MOD MDM: CPT

## 2024-02-23 ASSESSMENT — COLUMBIA-SUICIDE SEVERITY RATING SCALE - C-SSRS
1. IN THE PAST MONTH, HAVE YOU WISHED YOU WERE DEAD OR WISHED YOU COULD GO TO SLEEP AND NOT WAKE UP?: NO
2. HAVE YOU ACTUALLY HAD ANY THOUGHTS OF KILLING YOURSELF IN THE PAST MONTH?: NO
6. HAVE YOU EVER DONE ANYTHING, STARTED TO DO ANYTHING, OR PREPARED TO DO ANYTHING TO END YOUR LIFE?: NO

## 2024-02-23 ASSESSMENT — ACTIVITIES OF DAILY LIVING (ADL)
ADLS_ACUITY_SCORE: 35
ADLS_ACUITY_SCORE: 35
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
## 2024-02-23 NOTE — DISCHARGE INSTRUCTIONS
You should continue to wear your brace as needed, you can take Tylenol, ibuprofen or other medications and follow-up with your pain team, your primary doctor and to consider going to orthopedics for a recheck.

## 2024-05-05 ENCOUNTER — HOSPITAL ENCOUNTER (EMERGENCY)
Facility: CLINIC | Age: 24
Discharge: HOME | End: 2024-05-05
Attending: PHYSICIAN ASSISTANT | Admitting: PHYSICIAN ASSISTANT
Payer: COMMERCIAL

## 2024-05-05 VITALS
SYSTOLIC BLOOD PRESSURE: 120 MMHG | DIASTOLIC BLOOD PRESSURE: 71 MMHG | HEART RATE: 80 BPM | OXYGEN SATURATION: 99 % | HEIGHT: 67 IN | WEIGHT: 190 LBS | RESPIRATION RATE: 16 BRPM | TEMPERATURE: 97.5 F | BODY MASS INDEX: 29.82 KG/M2

## 2024-05-05 DIAGNOSIS — H69.93 DYSFUNCTION OF BOTH EUSTACHIAN TUBES: ICD-10-CM

## 2024-05-05 PROCEDURE — 99282 EMERGENCY DEPT VISIT SF MDM: CPT

## 2024-05-05 RX ORDER — OXYMETAZOLINE HYDROCHLORIDE 0.05 G/100ML
2 SPRAY NASAL 2 TIMES DAILY
Qty: 1 ML | Refills: 0 | Status: SHIPPED | OUTPATIENT
Start: 2024-05-05 | End: 2024-05-08

## 2024-05-05 ASSESSMENT — ACTIVITIES OF DAILY LIVING (ADL)
ADLS_ACUITY_SCORE: 35

## 2024-05-05 ASSESSMENT — COLUMBIA-SUICIDE SEVERITY RATING SCALE - C-SSRS
6. HAVE YOU EVER DONE ANYTHING, STARTED TO DO ANYTHING, OR PREPARED TO DO ANYTHING TO END YOUR LIFE?: NO
2. HAVE YOU ACTUALLY HAD ANY THOUGHTS OF KILLING YOURSELF IN THE PAST MONTH?: NO
1. IN THE PAST MONTH, HAVE YOU WISHED YOU WERE DEAD OR WISHED YOU COULD GO TO SLEEP AND NOT WAKE UP?: NO

## 2024-05-05 NOTE — ED TRIAGE NOTES
"Pt reports bilateral ear pain, L>R along with \"cotton mouth.\"      Triage Assessment (Adult)       Row Name 05/05/24 3169          Triage Assessment    Airway WDL WDL        Respiratory WDL    Respiratory WDL WDL        Skin Circulation/Temperature WDL    Skin Circulation/Temperature WDL WDL        Cardiac WDL    Cardiac WDL WDL        Peripheral/Neurovascular WDL    Peripheral Neurovascular WDL WDL        Cognitive/Neuro/Behavioral WDL    Cognitive/Neuro/Behavioral WDL WDL                     "

## 2024-05-06 NOTE — DISCHARGE INSTRUCTIONS
Your examination is reassuring today and there is no evidence of infection. Continue supportive cares including daily oral antihistamines as needed. You will be sent an intranasal decongestant called Afrin. Please use as prescribed and limit use to three days to avoid rebound symptoms. After three days, switch to once daily Flonase. Follow-up with primary care if symptoms do not improve in 5-7 days. For any new or worsening symptoms, present back to ED.

## 2024-05-06 NOTE — ED PROVIDER NOTES
"  Emergency Department Note      History of Present Illness     Chief Complaint  Otalgia    HPI  Alex Carvajal is a 23 year old male who presents with bilateral ear pain. Patient states he developed mild bilateral ear pain this past week and a sensation of fullness in left ear. Describes fullness as \"fluid in ear.\" Denies fevers, sore throat, ear discharge, or congestion. States he does have seasonal allergies however takes hydroxyzine for anxiety throughout the day. Denies sick contacts.    Independent Historian  None    Review of External Notes  None  Past Medical History   Medical History and Problem List  No past medical history on file.    Medications  oxymetazoline (AFRIN NASAL SPRAY) 0.05 % nasal spray        Surgical History   No past surgical history on file.  Physical Exam   Patient Vitals for the past 24 hrs:   BP Temp Temp src Pulse Resp SpO2 Height Weight   05/05/24 1853 120/71 97.5  F (36.4  C) Oral 80 16 99 % 1.702 m (5' 7\") 86.2 kg (190 lb)     Physical Exam  Constitutional: Alert, attentive, GCS 15  HENT:    Nose: Nose normal.    Mouth/Throat: Oropharynx is clear, mucous membranes are moist    Ears: Bilateral tympanic membranes are normal.  Bilateral external canals are normal.  No tragus pain.  Eyes: EOM are normal.   CV: regular rate and rhythm; no murmurs, rubs or gallups  Chest: Effort normal and breath sounds normal.   GI:  There is no tenderness. No distension. Normal bowel sounds  MSK: Normal range of motion.   Neurological: Alert, attentive  Skin: Skin is warm and dry.    Diagnostics   Lab Results   Labs Ordered and Resulted from Time of ED Arrival to Time of ED Departure - No data to display    Imaging  No orders to display       Independent Interpretation  None    ED Course    Medications Administered  Medications - No data to display    Procedures  Procedures     Discussion of Management  None    Social Determinants of Health adding to complexity of care  None    ED Course   "   Medical Decision Making / Diagnosis   CMS Diagnoses: None    MIPS     None    [facility]  Alex Carvajal is a 23 year old male who presents with bilateral ear pain and fullness in the left ear for the past several days.  He is afebrile, normotensive, nonhypoxic.  Physical examination reveals normal HEENT exam without evidence of otitis media, otitis externa, mastoiditis, or tympanic membrane perforation.  Antibiotics are not indicated.  Patient reports a fullness and fluid like sensation in his left ear.  Suspect mild eustachian tube dysfunction. Low suspicion for other otic process such as Meniere's, labyrinthitis, etc.  Recommend daily oral antihistamine and discharged intranasal Afrin.  He may then switch to Flonase as needed for symptoms.  Recommend follow-up with primary care in the next 5 to 7 days. No indication for further testing today. Patient comfortable with plan.  Return precautions to the ED discussed and patient discharged home.    Disposition  The patient was discharged.     ICD-10 Codes:    ICD-10-CM    1. Dysfunction of both eustachian tubes  H69.93            Discharge Medications  Discharge Medication List as of 5/5/2024  9:50 PM        START taking these medications    Details   oxymetazoline (AFRIN NASAL SPRAY) 0.05 % nasal spray Spray 0.2 mLs (2 sprays) in nostril 2 times daily for 3 days, Disp-1 mL, R-0, E-PrescribeNo drip 12 hour formula please             9:36 PM  May 5, 2024  VERO ABRAHAM PA-C    [employer]        Vero Abraham PA-C  05/05/24 6084       Vero Abraham PA-C  05/05/24 4535

## 2024-05-11 ENCOUNTER — HOSPITAL ENCOUNTER (EMERGENCY)
Facility: CLINIC | Age: 24
Discharge: HOME | End: 2024-05-11
Payer: COMMERCIAL

## 2024-05-11 VITALS
WEIGHT: 190 LBS | HEART RATE: 112 BPM | TEMPERATURE: 98.6 F | RESPIRATION RATE: 18 BRPM | DIASTOLIC BLOOD PRESSURE: 95 MMHG | BODY MASS INDEX: 29.82 KG/M2 | HEIGHT: 67 IN | OXYGEN SATURATION: 99 % | SYSTOLIC BLOOD PRESSURE: 137 MMHG

## 2024-05-11 DIAGNOSIS — J45.901 ACUTE ASTHMA EXACERBATION: ICD-10-CM

## 2024-05-11 PROCEDURE — 94640 AIRWAY INHALATION TREATMENT: CPT

## 2024-05-11 PROCEDURE — 250N000009 HC RX 250

## 2024-05-11 PROCEDURE — 250N000012 HC RX MED GY IP 250 OP 636 PS 637

## 2024-05-11 PROCEDURE — 99284 EMERGENCY DEPT VISIT MOD MDM: CPT | Mod: 25

## 2024-05-11 RX ORDER — ALBUTEROL SULFATE 0.83 MG/ML
2.5 SOLUTION RESPIRATORY (INHALATION) EVERY 6 HOURS PRN
Qty: 90 ML | Refills: 0 | Status: SHIPPED | OUTPATIENT
Start: 2024-05-11

## 2024-05-11 RX ORDER — PREDNISONE 20 MG/1
40 TABLET ORAL ONCE
Status: COMPLETED | OUTPATIENT
Start: 2024-05-11 | End: 2024-05-11

## 2024-05-11 RX ORDER — ALBUTEROL SULFATE 5 MG/ML
7.5 SOLUTION RESPIRATORY (INHALATION) ONCE
Status: COMPLETED | OUTPATIENT
Start: 2024-05-11 | End: 2024-05-11

## 2024-05-11 RX ORDER — IPRATROPIUM BROMIDE AND ALBUTEROL SULFATE 2.5; .5 MG/3ML; MG/3ML
6 SOLUTION RESPIRATORY (INHALATION) ONCE
Status: DISCONTINUED | OUTPATIENT
Start: 2024-05-11 | End: 2024-05-11

## 2024-05-11 RX ORDER — ALBUTEROL SULFATE 5 MG/ML
2.5 SOLUTION RESPIRATORY (INHALATION)
Status: DISCONTINUED | OUTPATIENT
Start: 2024-05-11 | End: 2024-05-11

## 2024-05-11 RX ORDER — PREDNISONE 20 MG/1
TABLET ORAL
Qty: 10 TABLET | Refills: 0 | Status: SHIPPED | OUTPATIENT
Start: 2024-05-11 | End: 2024-05-22

## 2024-05-11 RX ADMIN — ALBUTEROL SULFATE 7.5 MG: 2.5 SOLUTION RESPIRATORY (INHALATION) at 15:26

## 2024-05-11 RX ADMIN — PREDNISONE 40 MG: 20 TABLET ORAL at 15:26

## 2024-05-11 ASSESSMENT — ACTIVITIES OF DAILY LIVING (ADL)
ADLS_ACUITY_SCORE: 35

## 2024-05-11 NOTE — DISCHARGE INSTRUCTIONS
Prednisone 2 tablets for 5 additional days  Albuterol nebulizer every 6 hours  Return for worsening shortness of breath or chest tightness    Discharge Instructions  Asthma    Asthma is a condition causing narrowing and inflammation of the airways that can make it hard to breathe.  Asthma can also cause cough, wheezing, noisy breathing and tightness in the chest.  Asthma can be brought on or  triggered  by many things, including dust, mold, pollen, cigarette smoke, exercise, stress and infections (like the common cold).     Generally, every Emergency Department visit should have a follow-up clinic visit with either a primary or a specialty clinic/provider. Please follow-up as instructed by your emergency provider today.    Return to the Emergency Department if:  Your breathing gets worse.  You need to use your inhaler more often than every 4 hours, or cannot get relief from your inhaler.  You are very weak, or feel much more ill.  You develop new symptoms, such as chest pain.  You cough up blood.  You are vomiting (throwing up) enough that you cannot keep fluids or your medicine down.    What can I do to help myself?  Fill any prescriptions the provider gave you and take them right away--especially antibiotics. Be sure to finish the whole antibiotic prescription.  You may be given a prescription for an inhaler, which can help loosen tight air passages.  Use this as needed, but not more often than directed. Inhalers work much better when used with a spacer.   You may be given a prescription for a steroid to reduce inflammation. Used long-term, these can have some side effects, but for short-term use they are safe. You may notice restlessness or increased appetite (eating more).      You may use non-prescription cough or cold medicines. Cough medicines may help, but do not make the cough go away completely.   Avoid smoke, because this can make you feel worse. If you smoke, this may be a good time to quit! Consider  using nicotine lozenges, gum, or patches to reduce cravings.   If you have a fever, Tylenol  (acetaminophen), Motrin  (ibuprofen), or Advil  (ibuprofen), may help bring fever down and may help you feel more comfortable. Be sure to read and follow the package directions, and ask your provider if you have questions.  Be sure to get your flu shot each year.  For certain age groups, the pneumonia shot can help prevent pneumonia.  It is important that you follow up with your regular provider, to be sure that you are improving from this spell (an acute asthma exacerbation), and also to do what you can to keep from having trouble again. Sometimes you need long-term medicines to keep your asthma under control.   If you were given a prescription for medicine here today, be sure to read all of the information (including the package insert) that comes with your prescription.  This will include important information about the medicine, its side effects, and any warnings that you need to know about.  The pharmacist who fills the prescription can provide more information and answer questions you may have about the medicine.  If you have questions or concerns that the pharmacist cannot address, please call or return to the Emergency Department.   Remember that you can always come back to the Emergency Department if you are not able to see your regular provider in the amount of time listed above, if you get any new symptoms, or if there is anything that worries you.     normal

## 2024-05-11 NOTE — ED TRIAGE NOTES
Dx asthma, taken albuterol 3 times today. Still feeling SOB and difficulty obtaining a full deep breath. Hoping to get a prescription for a steroid.      Triage Assessment (Adult)       Row Name 05/11/24 1310          Triage Assessment    Airway WDL WDL        Respiratory WDL    Respiratory WDL cough;X        Skin Circulation/Temperature WDL    Skin Circulation/Temperature WDL WDL        Cardiac WDL    Cardiac WDL WDL        Peripheral/Neurovascular WDL    Peripheral Neurovascular WDL WDL        Cognitive/Neuro/Behavioral WDL    Cognitive/Neuro/Behavioral WDL WDL

## 2024-05-11 NOTE — ED PROVIDER NOTES
"Emergency Department Note      History of Present Illness   Chief Complaint  Breathing Problem    HPI  Alex Carvajal is a 23 year old male with a history of asthma who presents for evaluation for breathing problems. The patient reports that for about one month he has had difficulty breathing with chest tightening, shortness of breath, and a sore throat which he states feels like previous asthma and allergy exacerbations. States that his symptoms worsened today prompting his visit to the emergency department. Adds that he has attempted his budesonide and montelukast sodium with no relief. The patient notes that when he tries to breathe it feels as though \"his airway is closing\". Reports that with previous exacerbations, steroids have helped relieve his symptoms. Notably, the patient reports that he was a chronic THC user and his in the process of quitting. Notes that after he lost his job he increased his amount from 1/4 of wax to 3/4 wax a day, and has since been 20 days sober. Adds that he is 6 months sober from alcohol. Denies any cough, chest pain, fever, and pain or swelling in the legs. The patient denotes any recent long distance travel or sudden cardiac death to a relative.     Independent Historian  None    Review of External Notes  Office visit from 5/8/24.    Past Medical History   Medical History and Problem List  Migraine, without status migrainosus  Kleptomania, Adult  Alcohol use disorder  JONATHAN  Insomnia   Asthma  Panic Disorder, with agoraphobia  Depression   THC dependence     Medications  Albuterol   Deltasone  Singulair  Revia   Zoloft  Abilify  Hydroxyzine  Imitrex  Flexeril  Neurontin  Quetiapine  Remeron   Lunesta  Budesonide  Mobic  Catapres  Seroquel   Naproxen  Zofran  Mucinex  Inderal   Desyrel   Buspar   Prazosin     Surgical History   None   Physical Exam   Patient Vitals for the past 24 hrs:   BP Temp Temp src Pulse Resp SpO2 Height Weight   05/11/24 1451 -- -- -- -- -- 99 % -- " "--   05/11/24 1309 (!) 137/95 98.6  F (37  C) Temporal 110 20 96 % 1.702 m (5' 7\") 86.2 kg (190 lb)     Physical Exam    BP (!) 137/95   Pulse 110   Temp 98.6  F (37  C) (Temporal)   Resp 20   Ht 1.702 m (5' 7\")   Wt 86.2 kg (190 lb)   SpO2 99%   BMI 29.76 kg/m     General: Examined in room FT02.   Head: Atraumatic. Normocephalic.  EENT: PERRL. EOMI. Moist mucus membranes.   CV: Mildly tachycardic and regular rhythm.   Respiratory: Breathing comfortably on room air. End expiratory wheezing in bilateral upper lobes.  Quiet breath sounds at the bases bilaterally.  Msk: Extremities without tenderness to palpation or deformity.  No calf pain or swelling bilaterally.  Skin: Warm and dry. No rashes.  Neuro: Awake, alert, and conversant. No focal neurologic deficits.   Psych: Appropriate mood and affect.     Diagnostics   Independent Interpretation  None    ED Course    Medications Administered  Medications   predniSONE (DELTASONE) tablet 40 mg (40 mg Oral $Given 5/11/24 1526)   albuterol (PROVENTIL) neb solution 7.5 mg (7.5 mg Nebulization $Given 5/11/24 1526)     Procedures  Procedures     Discussion of Management  None    Social Determinants of Health adding to complexity of care  None    ED Course  ED Course as of 05/11/24 1700   Sat May 11, 2024   1508 I obtained history and examined the patient as noted above.    1608 Rechecked patient.  Lung sounds are clear.  Before, had decreased breath sounds and tight-sounding lungs.  No with very trace end expiratory wheezing and lung sounds throughout.     Medical Decision Making / Diagnosis   CMS Diagnoses: None    MIPS  None    [facility]  Alex Carvajal is a 23 year old male presenting today for evaluation of shortness of breath and chest tightness reminiscent of previous asthma exacerbations.  On arrival, vital signs are stable.  He is slightly tachycardic (though has had several albuterol inhaler administrations prior to arrival) but oxygen saturations are " appropriate and he is afebrile.  Differential included ACS, PE, pneumonia, pneumothorax, asthma exacerbation, viral syndrome.  On exam, he has diffuse end expiratory wheezing and quiet lung sounds at the bases.  Follow albuterol nebulizers as above, the patient feels significantly improved and lung sounds are much better.  He does have trace and expiratory wheezing, but the bases of his lungs are much clearer.  I do not feel that an x-ray is indicated today as the patient has not been having any fevers or cough to suggest a pneumonia.  History and exam is not consistent with ACS, PE or pneumothorax.  Do not feel that further workup is indicated.  He was given his first dose of prednisone today and will be discharged home with an additional several days of prednisone for asthma exacerbation.  He was given a nebulizer here and solution for albuterol nebulizers were sent.  He will follow-up closely with his primary care provider and certainly return for worsening of his asthma related symptoms.  Findings and plan were discussed in detail with the patient.  He was in agreement with the plan and was discharged home in stable condition.    Disposition  The patient was discharged.     ICD-10 Codes:    ICD-10-CM    1. Acute asthma exacerbation  J45.901 Respiratory therapy to instruct     Nebulizer and Supplies Order for DME - ONLY FOR DME           Discharge Medications  New Prescriptions    ALBUTEROL (PROVENTIL) (2.5 MG/3ML) 0.083% NEB SOLUTION    Take 1 vial (2.5 mg) by nebulization every 6 hours as needed for shortness of breath, wheezing or cough    PREDNISONE (DELTASONE) 20 MG TABLET    Take two tablets (= 40mg) each day for 5 (five) days     Scribe Disclosure:  IMarjan, am serving as a scribe at 4:48 PM on 5/11/2024 to document services personally performed by Batool Callahan PA-C based on my observations and the provider's statements to me.     Scribe Disclosure:  I, Felicia Brock, am serving as a  scribe  at 4:09 PM on 5/11/2024 to document services personally performed by Batool Callahan PA-C based on my observations and the provider's statements to me.    Batool Callahan PA-C  May 11, 2024  [employer]        Batool Callahan PA-C  05/11/24 1824

## 2024-05-14 ENCOUNTER — HOSPITAL ENCOUNTER (EMERGENCY)
Facility: CLINIC | Age: 24
Discharge: HOME | End: 2024-05-15
Attending: EMERGENCY MEDICINE | Admitting: EMERGENCY MEDICINE
Payer: COMMERCIAL

## 2024-05-14 VITALS
RESPIRATION RATE: 16 BRPM | SYSTOLIC BLOOD PRESSURE: 126 MMHG | WEIGHT: 195 LBS | BODY MASS INDEX: 30.61 KG/M2 | OXYGEN SATURATION: 97 % | HEIGHT: 67 IN | HEART RATE: 93 BPM | TEMPERATURE: 98.6 F | DIASTOLIC BLOOD PRESSURE: 51 MMHG

## 2024-05-14 DIAGNOSIS — R06.02 SOB (SHORTNESS OF BREATH): ICD-10-CM

## 2024-05-14 PROCEDURE — 99283 EMERGENCY DEPT VISIT LOW MDM: CPT

## 2024-05-14 ASSESSMENT — ACTIVITIES OF DAILY LIVING (ADL)
ADLS_ACUITY_SCORE: 35
ADLS_ACUITY_SCORE: 35

## 2024-05-15 RX ORDER — PREDNISONE 20 MG/1
20 TABLET ORAL DAILY
Qty: 2 TABLET | Refills: 0 | Status: SHIPPED | OUTPATIENT
Start: 2024-05-15 | End: 2024-05-22

## 2024-05-15 ASSESSMENT — ACTIVITIES OF DAILY LIVING (ADL): ADLS_ACUITY_SCORE: 35

## 2024-05-15 NOTE — DISCHARGE INSTRUCTIONS
As we discussed, prednisone can make anxiety worse, so please keep an eye on this. Please follow up with your doctor in clinic for continued evaluation and  management.

## 2024-05-15 NOTE — ED PROVIDER NOTES
"  Emergency Department Note      History of Present Illness     Chief Complaint  Shortness of Breath     HPI  Alex Carvajal is a 23 year old male with a history of asthma and anxiety presenting to the ED for evaluation of shortness of breath. The patient reports that he has been feeling chest tightness for the past 4-5 days. He followed up with family medicine two days ago and they told him he was not wheezing. He has been taking 40 mg prednisone daily, but has been taking the two pills three hours apart instead of at the same time. He feels better than he did originally, though he feels like his relief is waning and the prednisone only helps him for a few hours. He adds that he has been having some anxiety and takes 3 clonidine daily. The patient is using all of his breathing treatments regularly.     Independent Historian  None    Review of External Notes  I reviewed the family medicine note from 5/13/24 for asthma.    Past Medical History   Medical History and Problem List  Migraine, without status migrainosus  Kleptomania, Adult  Alcohol use disorder  JONATHAN  Insomnia   Asthma  Panic Disorder, with agoraphobia  Depression   THC dependence     Medications  Albuterol   Deltasone  Singulair  Revia   Zoloft  Abilify  Hydroxyzine  Imitrex  Flexeril  Neurontin  Quetiapine  Remeron   Lunesta  Budesonide  Mobic  Catapres  Seroquel   Naproxen  Zofran  Mucinex  Inderal   Desyrel   Buspar   Prazosin     Physical Exam   Patient Vitals for the past 24 hrs:   BP Temp Temp src Pulse Resp SpO2 Height Weight   05/14/24 2142 -- -- -- -- -- -- 1.702 m (5' 7\") 88.5 kg (195 lb)   05/14/24 2138 126/51 98.6  F (37  C) Oral 93 16 97 % -- --     Physical Exam  General: Appears well-developed and well-nourished.   Head: No signs of trauma.   Mouth/Throat: Oropharynx is clear and moist.   CV: Normal rate and regular rhythm.    Resp: Effort normal and breath sounds normal. No respiratory distress.   GI: Soft. There is no tenderness. "  No rebound or guarding.  Normal bowel sounds.    MSK: Normal range of motion. no edema. No Calf tenderness.  Neuro: The patient is alert and oriented. Speech normal.  Skin: Skin is warm and dry. No rash noted.   Psych: normal mood and affect. behavior is normal.       Diagnostics   Lab Results   Labs Ordered and Resulted from Time of ED Arrival to Time of ED Departure - No data to display    Imaging  No orders to display     Independent Interpretation  None    ED Course    Medications Administered  Medications - No data to display    Procedures  Procedures     Discussion of Management  None    Social Determinants of Health adding to complexity of care  None    ED Course  ED Course as of 05/15/24 0231   Wed May 15, 2024   0118 I obtained history and examined the patient as noted above.       Medical Decision Making / Diagnosis   CMS Diagnoses: None    MIPS     None    MDM  Alex Carvajal is a 23 year old male presents due to a feeling of shortness of breath consistent with his asthma.  He reports that he feels like his asthma has been worse over the last few days.  He had been seen in the emergency department and given a prescription for prednisone and then followed up with his doctor in clinic.  He states that he feels overall better, but still has the symptoms.  On my evaluation he did not appear in distress.  He had clear lung sounds and appropriate vital signs.  I discussed whether anxiety could be playing a role in his symptoms as it can cause similar symptoms and could be exacerbated by prednisone.  Ultimately after discussion with the patient, I did agree to give prescription for a couple of additional prednisone tablets so that he could take 60 mg for 2 days.  At this point he does not appear in any distress and I felt is appropriate for continued outpatient management.    Disposition  The patient was discharged.     ICD-10 Codes:    ICD-10-CM    1. SOB (shortness of breath)  R06.02           Discharge Medications  Discharge Medication List as of 5/15/2024  1:30 AM        START taking these medications    Details   !! predniSONE (DELTASONE) 20 MG tablet Take 1 tablet (20 mg) by mouth daily, Disp-2 tablet, R-0, Local Print       !! - Potential duplicate medications found. Please discuss with provider.        Scribe Disclosure:  I, Marisa Emmie, am serving as a scribe at 1:46 AM on 5/15/2024 to document services personally performed by Dandy Giordano MD based on my observations and the provider's statements to me.        Dandy Giordano MD  05/15/24 0652

## 2024-05-15 NOTE — ED TRIAGE NOTES
Hx of asthma. Was seen here on Saturday for the same but not as bad today. Started on prednisone. Only using his inhalers but not his neb.     Triage Assessment (Adult)       Row Name 05/14/24 8921          Triage Assessment    Airway WDL WDL        Respiratory WDL    Respiratory WDL X  chest tightness with SOB        Skin Circulation/Temperature WDL    Skin Circulation/Temperature WDL WDL        Cardiac WDL    Cardiac WDL X  chest tightness        Peripheral/Neurovascular WDL    Peripheral Neurovascular WDL WDL        Cognitive/Neuro/Behavioral WDL    Cognitive/Neuro/Behavioral WDL WDL

## 2024-05-22 ENCOUNTER — HOSPITAL ENCOUNTER (EMERGENCY)
Facility: CLINIC | Age: 24
Discharge: HOME | End: 2024-05-22
Attending: EMERGENCY MEDICINE | Admitting: EMERGENCY MEDICINE
Payer: COMMERCIAL

## 2024-05-22 ENCOUNTER — APPOINTMENT (OUTPATIENT)
Dept: GENERAL RADIOLOGY | Facility: CLINIC | Age: 24
End: 2024-05-22
Attending: EMERGENCY MEDICINE
Payer: COMMERCIAL

## 2024-05-22 VITALS
SYSTOLIC BLOOD PRESSURE: 128 MMHG | RESPIRATION RATE: 20 BRPM | WEIGHT: 195 LBS | HEART RATE: 106 BPM | DIASTOLIC BLOOD PRESSURE: 84 MMHG | HEIGHT: 67 IN | OXYGEN SATURATION: 98 % | TEMPERATURE: 97.8 F | BODY MASS INDEX: 30.61 KG/M2

## 2024-05-22 DIAGNOSIS — J45.901 ASTHMA WITH ACUTE EXACERBATION, UNSPECIFIED ASTHMA SEVERITY, UNSPECIFIED WHETHER PERSISTENT: ICD-10-CM

## 2024-05-22 DIAGNOSIS — R06.02 SHORTNESS OF BREATH: ICD-10-CM

## 2024-05-22 LAB
ANION GAP SERPL CALCULATED.3IONS-SCNC: 9 MMOL/L (ref 7–15)
ATRIAL RATE - MUSE: 92 BPM
BASOPHILS # BLD AUTO: 0.1 10E3/UL (ref 0–0.2)
BASOPHILS NFR BLD AUTO: 1 %
BUN SERPL-MCNC: 9.1 MG/DL (ref 6–20)
CALCIUM SERPL-MCNC: 9.6 MG/DL (ref 8.6–10)
CHLORIDE SERPL-SCNC: 100 MMOL/L (ref 98–107)
CREAT SERPL-MCNC: 0.83 MG/DL (ref 0.67–1.17)
D DIMER PPP FEU-MCNC: 0.31 UG/ML FEU (ref 0–0.5)
DEPRECATED HCO3 PLAS-SCNC: 29 MMOL/L (ref 22–29)
DIASTOLIC BLOOD PRESSURE - MUSE: NORMAL MMHG
EGFRCR SERPLBLD CKD-EPI 2021: >90 ML/MIN/1.73M2
EOSINOPHIL # BLD AUTO: 0.5 10E3/UL (ref 0–0.7)
EOSINOPHIL NFR BLD AUTO: 4 %
ERYTHROCYTE [DISTWIDTH] IN BLOOD BY AUTOMATED COUNT: 12.2 % (ref 10–15)
GLUCOSE SERPL-MCNC: 93 MG/DL (ref 70–99)
HCT VFR BLD AUTO: 43.5 % (ref 40–53)
HGB BLD-MCNC: 14.2 G/DL (ref 13.3–17.7)
IMM GRANULOCYTES # BLD: 0.1 10E3/UL
IMM GRANULOCYTES NFR BLD: 1 %
INTERPRETATION ECG - MUSE: NORMAL
LYMPHOCYTES # BLD AUTO: 1.6 10E3/UL (ref 0.8–5.3)
LYMPHOCYTES NFR BLD AUTO: 13 %
MCH RBC QN AUTO: 30.5 PG (ref 26.5–33)
MCHC RBC AUTO-ENTMCNC: 32.6 G/DL (ref 31.5–36.5)
MCV RBC AUTO: 94 FL (ref 78–100)
MONOCYTES # BLD AUTO: 1.2 10E3/UL (ref 0–1.3)
MONOCYTES NFR BLD AUTO: 9 %
NEUTROPHILS # BLD AUTO: 9.3 10E3/UL (ref 1.6–8.3)
NEUTROPHILS NFR BLD AUTO: 72 %
NRBC # BLD AUTO: 0 10E3/UL
NRBC BLD AUTO-RTO: 0 /100
P AXIS - MUSE: 0 DEGREES
PLATELET # BLD AUTO: 250 10E3/UL (ref 150–450)
POTASSIUM SERPL-SCNC: 3.9 MMOL/L (ref 3.4–5.3)
PR INTERVAL - MUSE: 124 MS
QRS DURATION - MUSE: 92 MS
QT - MUSE: 336 MS
QTC - MUSE: 415 MS
R AXIS - MUSE: 94 DEGREES
RBC # BLD AUTO: 4.65 10E6/UL (ref 4.4–5.9)
SODIUM SERPL-SCNC: 138 MMOL/L (ref 135–145)
SYSTOLIC BLOOD PRESSURE - MUSE: NORMAL MMHG
T AXIS - MUSE: 12 DEGREES
TROPONIN T SERPL HS-MCNC: <6 NG/L
VENTRICULAR RATE- MUSE: 92 BPM
WBC # BLD AUTO: 12.8 10E3/UL (ref 4–11)

## 2024-05-22 PROCEDURE — 99285 EMERGENCY DEPT VISIT HI MDM: CPT | Mod: 25

## 2024-05-22 PROCEDURE — 93005 ELECTROCARDIOGRAM TRACING: CPT

## 2024-05-22 PROCEDURE — 85025 COMPLETE CBC W/AUTO DIFF WBC: CPT | Performed by: EMERGENCY MEDICINE

## 2024-05-22 PROCEDURE — 71046 X-RAY EXAM CHEST 2 VIEWS: CPT

## 2024-05-22 PROCEDURE — 80048 BASIC METABOLIC PNL TOTAL CA: CPT | Performed by: EMERGENCY MEDICINE

## 2024-05-22 PROCEDURE — 84484 ASSAY OF TROPONIN QUANT: CPT | Performed by: EMERGENCY MEDICINE

## 2024-05-22 PROCEDURE — 250N000013 HC RX MED GY IP 250 OP 250 PS 637: Performed by: EMERGENCY MEDICINE

## 2024-05-22 PROCEDURE — 36415 COLL VENOUS BLD VENIPUNCTURE: CPT | Performed by: EMERGENCY MEDICINE

## 2024-05-22 PROCEDURE — 85379 FIBRIN DEGRADATION QUANT: CPT | Performed by: EMERGENCY MEDICINE

## 2024-05-22 RX ORDER — PREDNISONE 20 MG/1
TABLET ORAL
Qty: 12 TABLET | Refills: 0 | Status: SHIPPED | OUTPATIENT
Start: 2024-05-22 | End: 2024-06-02

## 2024-05-22 RX ORDER — HYDROXYZINE HYDROCHLORIDE 25 MG/1
25 TABLET, FILM COATED ORAL ONCE
Status: COMPLETED | OUTPATIENT
Start: 2024-05-22 | End: 2024-05-22

## 2024-05-22 RX ORDER — CETIRIZINE HYDROCHLORIDE 10 MG/1
10 TABLET ORAL DAILY
Qty: 30 TABLET | Refills: 0 | Status: SHIPPED | OUTPATIENT
Start: 2024-05-22 | End: 2024-06-21

## 2024-05-22 RX ADMIN — HYDROXYZINE HYDROCHLORIDE 25 MG: 25 TABLET, FILM COATED ORAL at 13:55

## 2024-05-22 ASSESSMENT — ACTIVITIES OF DAILY LIVING (ADL)
ADLS_ACUITY_SCORE: 35

## 2024-05-22 NOTE — ED PROVIDER NOTES
"  Emergency Department Note      History of Present Illness     Chief Complaint  Breathing Problem (/)    HPI  Alex Carvajal is a 23 year old male presenting to the emergency department for evaluation of increased work of breathing. The patient reports that he was seen recently for an asthma exacerbation for which he was prescribed steroids which he is now done with. He reports feeling a like he is having an asthma exacerbation and notes feeling like he cannot get a full breath in. He notes this episode is not as bad as his most recent asthma exacerbation. He denies any cough, fever, sneezing, scratchy throat, nausea, vomiting, diarrhea, or history of anemia. He notes he is on Hydroxyzine and Singulair, but is not currently using Claritin or Zyrtec.     Independent Historian  None    Review of External Notes  Seen at [place] 5/11 and 5/14, PCP 5/13 with extending prednisone and increased pulmicort, no wheezing appreciated    Past Medical History   Medical History and Problem List  Asthma   Migraine   Alcohol use disorder   Insomnia   Depression      Medications  Albuterol   Prednisone   Hydroxyzine   Singulair   Zoloft   Abilify   Seroquel XR  Neurontin   Lunesta   Wegovy  Catapres   Mobic       Surgical History   None  Physical Exam   Patient Vitals for the past 24 hrs:   BP Temp Temp src Pulse Resp SpO2 Height Weight   05/22/24 1109 (!) 145/88 97.8  F (36.6  C) Temporal 115 20 99 % 1.702 m (5' 7\") 88.5 kg (195 lb)     Physical Exam    Eyes:               Sclera white; Pupils are equal and round  ENT:                External ears and nares normal  CV:                  Rate as above with regular rhythm   Resp:               Breath sounds clear and equal bilaterally, prolonged expiratory phase                          Non-labored, no retractions or accessory muscle use  MS:                  Moves all extremities  Skin:                Warm and dry  Neuro:             Speech is normal and fluent. No apparent " deficit.        Diagnostics   Lab Results   Labs Ordered and Resulted from Time of ED Arrival to Time of ED Departure   CBC WITH PLATELETS AND DIFFERENTIAL - Abnormal       Result Value    WBC Count 12.8 (*)     RBC Count 4.65      Hemoglobin 14.2      Hematocrit 43.5      MCV 94      MCH 30.5      MCHC 32.6      RDW 12.2      Platelet Count 250      % Neutrophils 72      % Lymphocytes 13      % Monocytes 9      % Eosinophils 4      % Basophils 1      % Immature Granulocytes 1      NRBCs per 100 WBC 0      Absolute Neutrophils 9.3 (*)     Absolute Lymphocytes 1.6      Absolute Monocytes 1.2      Absolute Eosinophils 0.5      Absolute Basophils 0.1      Absolute Immature Granulocytes 0.1      Absolute NRBCs 0.0     D DIMER QUANTITATIVE - Normal    D-Dimer Quantitative 0.31     BASIC METABOLIC PANEL - Normal    Sodium 138      Potassium 3.9      Chloride 100      Carbon Dioxide (CO2) 29      Anion Gap 9      Urea Nitrogen 9.1      Creatinine 0.83      GFR Estimate >90      Calcium 9.6      Glucose 93     TROPONIN T, HIGH SENSITIVITY - Normal    Troponin T, High Sensitivity <6         Imaging  Chest XR,  PA & LAT   Final Result   IMPRESSION: Normal.      SID DENISE MD            SYSTEM ID:  [id number]          EKG   ECG results from 05/22/24   EKG 12-lead, tracing only     Value    Systolic Blood Pressure     Diastolic Blood Pressure     Ventricular Rate 92    Atrial Rate 92    NY Interval 124    QRS Duration 92        QTc 415    P Axis 0    R AXIS 94    T Axis 12    Interpretation ECG      Sinus rhythm  Rightward axis  Borderline ECG  When compared with ECG of 18-AUG-2023 22:40,  No significant change was found  Confirmed by GENERATED REPORT, COMPUTER ([id number]),  Deisy Funk ([id number]) on 5/22/2024 1:33:02 PM          Independent Interpretation  CXR - no effusion, pneumothorax, or pneumonia     ED Course    Medications Administered  Medications   hydrOXYzine HCl (ATARAX) tablet 25 mg (25 mg Oral $Given 5/22/24  4255)       Procedures  Procedures     Discussion of Management  None    Social Determinants of Health adding to complexity of care  None    ED Course  ED Course as of 05/22/24 1424   Wed May 22, 2024   1125 I obtained history and examined the patient as noted above.    1422 I reevaluated the patient.       Medical Decision Making / Diagnosis   CMS Diagnoses: None    MIPS     None    MDM  Feels short of breath but no wheezing on exam, just prolonged expiratory phase.  Broader work up undertaken.  Due to tachycardia PE also considered.  D-dimer normal.  CT not indicated.  CXR without infection or other contributors to symptoms.  Is not anemic.  Does not appear volume overloaded.  Had anxiety here during workup and hydroxyzine given.  Also has seasonal allergies.  Prednisone taper and zyrtec prescribed.    Disposition  The patient was discharged.     ICD-10 Codes:    ICD-10-CM    1. Shortness of breath  R06.02       2. Asthma with acute exacerbation, unspecified asthma severity, unspecified whether persistent  J45.901            Scribe Disclosure:  I, Ronnie Whitley, am serving as a scribe at 11:49 AM on 5/22/2024 to document services personally performed by Cass Donald MD based on my observations and the provider's statements to me.        Cass Donald MD  05/24/24 8467

## 2024-05-22 NOTE — ED TRIAGE NOTES
Recently treated for asthma exacerbation. Finished steroids a few days ago and feels like they have worn off.      Triage Assessment (Adult)       Row Name 05/22/24 1111          Triage Assessment    Airway WDL WDL        Respiratory WDL    Respiratory WDL WDL        Skin Circulation/Temperature WDL    Skin Circulation/Temperature WDL WDL        Peripheral/Neurovascular WDL    Peripheral Neurovascular WDL WDL        Cognitive/Neuro/Behavioral WDL    Cognitive/Neuro/Behavioral WDL WDL

## 2024-06-08 ENCOUNTER — HOSPITAL ENCOUNTER (EMERGENCY)
Facility: CLINIC | Age: 24
Discharge: HOME | End: 2024-06-08
Attending: EMERGENCY MEDICINE | Admitting: EMERGENCY MEDICINE
Payer: COMMERCIAL

## 2024-06-08 ENCOUNTER — APPOINTMENT (OUTPATIENT)
Dept: GENERAL RADIOLOGY | Facility: CLINIC | Age: 24
End: 2024-06-08
Attending: EMERGENCY MEDICINE
Payer: COMMERCIAL

## 2024-06-08 VITALS
OXYGEN SATURATION: 95 % | HEART RATE: 100 BPM | HEIGHT: 66 IN | RESPIRATION RATE: 18 BRPM | TEMPERATURE: 97.7 F | SYSTOLIC BLOOD PRESSURE: 129 MMHG | WEIGHT: 205 LBS | BODY MASS INDEX: 32.95 KG/M2 | DIASTOLIC BLOOD PRESSURE: 73 MMHG

## 2024-06-08 DIAGNOSIS — J45.901 MILD ASTHMA WITH EXACERBATION, UNSPECIFIED WHETHER PERSISTENT: ICD-10-CM

## 2024-06-08 PROCEDURE — 250N000009 HC RX 250: Performed by: EMERGENCY MEDICINE

## 2024-06-08 PROCEDURE — 94640 AIRWAY INHALATION TREATMENT: CPT

## 2024-06-08 PROCEDURE — 99283 EMERGENCY DEPT VISIT LOW MDM: CPT | Mod: 25

## 2024-06-08 PROCEDURE — 71046 X-RAY EXAM CHEST 2 VIEWS: CPT

## 2024-06-08 RX ORDER — ALBUTEROL SULFATE 0.83 MG/ML
2.5 SOLUTION RESPIRATORY (INHALATION) ONCE
Status: COMPLETED | OUTPATIENT
Start: 2024-06-08 | End: 2024-06-08

## 2024-06-08 RX ORDER — PREDNISONE 20 MG/1
TABLET ORAL
Qty: 10 TABLET | Refills: 0 | Status: SHIPPED | OUTPATIENT
Start: 2024-06-08 | End: 2024-07-06

## 2024-06-08 RX ADMIN — ALBUTEROL SULFATE 2.5 MG: 2.5 SOLUTION RESPIRATORY (INHALATION) at 12:49

## 2024-06-08 ASSESSMENT — ACTIVITIES OF DAILY LIVING (ADL)
ADLS_ACUITY_SCORE: 35

## 2024-06-08 NOTE — ED PROVIDER NOTES
"  Emergency Department Note      History of Present Illness     Chief Complaint  Shortness of Breath (History of asthma )    HPI  Alex Carvajal is a 23 year old male with history of asthma who presents to the ED for evaluation of shortness of breath. The patient states that he has been having some chest tightness and shortness of breath for the past two days, he notes that this feels similar to asthma attacks that he has had before. He describes the tightness as feeling unable to obtain a full breath. The patient notes that he tried a home nebulizer which partially relieved symptoms for approximately 15 minutes. He reports that he has been in the hospital 3 times in the past month for asthma attacks which he has been prescribed prednisone for. Notably he was drinking camomile tea in the hopes of alleviating some of his chest tightness, patient did not state if this helped with his symptoms.    Independent Historian  None    Review of External Notes  I reviewed a [facility] note from internal medicine clinic [place] 6/3/2024    Past Medical History   Medical History and Problem List  Depression   Anxiety   Asthma   Panic disorder with agoraphobia  Obesity   Migraine   Insomnia   Tetrahydrocannabinol use disorder  Alcohol use disorder     Medications  Albuterol  Trazodone   Atarax   Buspar  Zoloft   Abilify   Neurontin   Flexeril   Catapres   Revia   Minipress  Remeron   Lunesta   Mobic  Lamictal   Elavil  Lioresal    Surgical History   No past surgical history on file.    Physical Exam   Patient Vitals for the past 24 hrs:   BP Temp Temp src Pulse Resp SpO2 Height Weight   06/08/24 1048 129/73 97.7  F (36.5  C) -- 100 18 95 % 1.676 m (5' 6\") 93 kg (205 lb)   06/08/24 1044 129/73 98.4  F (36.9  C) Temporal 102 16 95 % 1.676 m (5' 6\") 93 kg (205 lb)     Physical Exam  General: Alert, No distress. Nontoxic appearance  Head: No signs of trauma.   Mouth/Throat: Oropharynx moist.   Eyes: " Conjunctivae are normal. Pupils are equal..   Neck: Normal range of motion.    CV: Appears well perfused.  Resp:No respiratory distress. Lungs were clear.    MSK: Normal range of motion. No obvious deformity.   Neuro: The patient is alert and interactive. VILLARREAL. Speech normal. GCS 15  Skin: No lesion or sign of trauma noted.   Psych: normal mood and affect. behavior is normal.     Diagnostics   Lab Results   Labs Ordered and Resulted from Time of ED Arrival to Time of ED Departure - No data to display    Imaging  XR Chest 2 Views   Final Result   IMPRESSION: Negative chest.        Independent Interpretation  Chest x-ray shows no pneumothorax  ED Course    Medications Administered  Medications   albuterol (PROVENTIL) neb solution 2.5 mg (2.5 mg Nebulization $Given 6/8/24 7056)     Procedures  Procedures     Discussion of Management  None    Social Determinants of Health adding to complexity of care  Stress/Adjustment Disorders    ED Course  ED Course as of 06/08/24 1311   Sat Jun 08, 2024   1214 I obtained history and examined the patient as noted above.      Medical Decision Making / Diagnosis       MDM    Alex Carvajal is a 23 year old male who presents for evaluation of shortness of breath.  Signs and symptoms are consistent with asthma exacerbation.  A broad differential was considered including foreign body, asthma, pneumonia, bronchitis, reactive airway disease, pneumothorax, cardiac equivalent, viral induced wheezing, allergic phenomena, etc.  He feels improved after interventions here in ED.  There is no signs at this point of pneumonia and BC's and abx are not indicated.  Given the patients continued symptoms, I will hospitalize at this point for further cares due to risk in outpatient management.         Disposition  The patient was discharged.     ICD-10 Codes:    ICD-10-CM    1. Mild asthma with exacerbation, unspecified whether persistent  J45.901            Discharge Medications  Discharge  Medication List as of 6/8/2024  1:59 PM        START taking these medications    Details   predniSONE (DELTASONE) 20 MG tablet Take two tablets (= 40mg) each day for 5 (five) days, Disp-10 tablet, R-0, Local Print             I, Paradise Fitzgerald, am serving as a scribe at 12:02 PM on 6/8/2024 to document services personally performed by Carlton Del Castillo MD based on my observations and the provider's statements to me.      Carlton Del Castillo MD  06/08/24 1526

## 2024-06-08 NOTE — ED TRIAGE NOTES
Pt c/o of shortness of breath since yesterday, has history of asthma. Pt denies any cough or fever.

## 2024-07-06 ENCOUNTER — HOSPITAL ENCOUNTER (OUTPATIENT)
Facility: CLINIC | Age: 24
Setting detail: OBSERVATION
Discharge: HOME | End: 2024-07-06
Attending: EMERGENCY MEDICINE | Admitting: EMERGENCY MEDICINE
Payer: COMMERCIAL

## 2024-07-06 VITALS
BODY MASS INDEX: 34.17 KG/M2 | HEIGHT: 67 IN | SYSTOLIC BLOOD PRESSURE: 138 MMHG | HEART RATE: 115 BPM | OXYGEN SATURATION: 99 % | RESPIRATION RATE: 16 BRPM | TEMPERATURE: 97.7 F | WEIGHT: 217.7 LBS | DIASTOLIC BLOOD PRESSURE: 88 MMHG

## 2024-07-06 DIAGNOSIS — F41.1 GENERALIZED ANXIETY DISORDER: ICD-10-CM

## 2024-07-06 DIAGNOSIS — F12.21 CANNABIS USE DISORDER, SEVERE, IN EARLY REMISSION, DEPENDENCE (H): ICD-10-CM

## 2024-07-06 DIAGNOSIS — F33.1 MAJOR DEPRESSIVE DISORDER, RECURRENT EPISODE, MODERATE (H): ICD-10-CM

## 2024-07-06 DIAGNOSIS — F10.90 ALCOHOL USE DISORDER: ICD-10-CM

## 2024-07-06 PROBLEM — F41.9 ANXIETY: Status: ACTIVE | Noted: 2024-07-06

## 2024-07-06 PROCEDURE — 99285 EMERGENCY DEPT VISIT HI MDM: CPT

## 2024-07-06 PROCEDURE — 250N000013 HC RX MED GY IP 250 OP 250 PS 637: Performed by: NURSE PRACTITIONER

## 2024-07-06 PROCEDURE — G0378 HOSPITAL OBSERVATION PER HR: HCPCS

## 2024-07-06 RX ORDER — MONTELUKAST SODIUM 10 MG/1
10 TABLET ORAL AT BEDTIME
Status: DISCONTINUED | OUTPATIENT
Start: 2024-07-06 | End: 2024-07-07 | Stop reason: HOSPADM

## 2024-07-06 RX ORDER — MUPIROCIN 20 MG/G
OINTMENT TOPICAL 3 TIMES DAILY
COMMUNITY
Start: 2024-07-02

## 2024-07-06 RX ORDER — GABAPENTIN 600 MG/1
900 TABLET ORAL 4 TIMES DAILY PRN
COMMUNITY
Start: 2024-03-07 | End: 2024-09-03

## 2024-07-06 RX ORDER — SUMATRIPTAN 50 MG/1
50 TABLET, FILM COATED ORAL 2 TIMES DAILY PRN
Status: DISCONTINUED | OUTPATIENT
Start: 2024-07-06 | End: 2024-07-07 | Stop reason: HOSPADM

## 2024-07-06 RX ORDER — SERTRALINE HYDROCHLORIDE 100 MG/1
200 TABLET, FILM COATED ORAL DAILY
COMMUNITY

## 2024-07-06 RX ORDER — CAPSAICIN 0.025 %
CREAM (GRAM) TOPICAL 3 TIMES DAILY PRN
Status: DISCONTINUED | OUTPATIENT
Start: 2024-07-06 | End: 2024-07-07 | Stop reason: HOSPADM

## 2024-07-06 RX ORDER — MIRTAZAPINE 15 MG/1
7.5 TABLET, FILM COATED ORAL DAILY
COMMUNITY

## 2024-07-06 RX ORDER — CLONIDINE HYDROCHLORIDE 0.1 MG/1
0.1 TABLET ORAL 4 TIMES DAILY
Status: DISCONTINUED | OUTPATIENT
Start: 2024-07-06 | End: 2024-07-07 | Stop reason: HOSPADM

## 2024-07-06 RX ORDER — ONDANSETRON 4 MG/1
4-8 TABLET, ORALLY DISINTEGRATING ORAL 3 TIMES DAILY
Status: DISCONTINUED | OUTPATIENT
Start: 2024-07-06 | End: 2024-07-07 | Stop reason: HOSPADM

## 2024-07-06 RX ORDER — QUETIAPINE FUMARATE 50 MG/1
1 TABLET, FILM COATED ORAL AT BEDTIME
COMMUNITY
Start: 2024-07-03

## 2024-07-06 RX ORDER — BACLOFEN 10 MG/1
10 TABLET ORAL 3 TIMES DAILY
Status: DISCONTINUED | OUTPATIENT
Start: 2024-07-06 | End: 2024-07-07 | Stop reason: HOSPADM

## 2024-07-06 RX ORDER — ALBUTEROL SULFATE 0.83 MG/ML
2.5 SOLUTION RESPIRATORY (INHALATION) EVERY 6 HOURS PRN
Status: DISCONTINUED | OUTPATIENT
Start: 2024-07-06 | End: 2024-07-07 | Stop reason: HOSPADM

## 2024-07-06 RX ORDER — ACETAMINOPHEN AND CODEINE PHOSPHATE 300; 15 MG/1; MG/1
1 TABLET ORAL DAILY PRN
COMMUNITY
Start: 2024-06-18 | End: 2024-07-16

## 2024-07-06 RX ORDER — FLUTICASONE PROPIONATE 50 MCG
1 SPRAY, SUSPENSION (ML) NASAL DAILY
COMMUNITY
Start: 2024-07-05

## 2024-07-06 RX ORDER — ALBUTEROL SULFATE 90 UG/1
1-2 AEROSOL, METERED RESPIRATORY (INHALATION) EVERY 4 HOURS PRN
COMMUNITY

## 2024-07-06 RX ORDER — LAMOTRIGINE 150 MG/1
1 TABLET ORAL DAILY
COMMUNITY
Start: 2024-07-01

## 2024-07-06 RX ORDER — ESZOPICLONE 1 MG/1
1 TABLET, FILM COATED ORAL
COMMUNITY
Start: 2024-05-09

## 2024-07-06 RX ORDER — QUETIAPINE FUMARATE 50 MG/1
50 TABLET, FILM COATED ORAL AT BEDTIME
Status: DISCONTINUED | OUTPATIENT
Start: 2024-07-06 | End: 2024-07-07 | Stop reason: HOSPADM

## 2024-07-06 RX ORDER — CLONIDINE HYDROCHLORIDE 0.1 MG/1
1 TABLET ORAL 4 TIMES DAILY
COMMUNITY
Start: 2024-01-15

## 2024-07-06 RX ORDER — FLUTICASONE PROPIONATE 50 MCG
1 SPRAY, SUSPENSION (ML) NASAL DAILY
Status: DISCONTINUED | OUTPATIENT
Start: 2024-07-07 | End: 2024-07-07 | Stop reason: HOSPADM

## 2024-07-06 RX ORDER — LIDOCAINE 50 MG/G
OINTMENT TOPICAL 3 TIMES DAILY
COMMUNITY

## 2024-07-06 RX ORDER — HYDROXYZINE HYDROCHLORIDE 25 MG/1
25-50 TABLET, FILM COATED ORAL EVERY 8 HOURS PRN
Status: DISCONTINUED | OUTPATIENT
Start: 2024-07-06 | End: 2024-07-07 | Stop reason: HOSPADM

## 2024-07-06 RX ORDER — MONTELUKAST SODIUM 10 MG/1
10 TABLET ORAL AT BEDTIME
COMMUNITY
Start: 2023-08-07

## 2024-07-06 RX ORDER — OLANZAPINE 5 MG/1
5 TABLET, ORALLY DISINTEGRATING ORAL 2 TIMES DAILY PRN
Status: DISCONTINUED | OUTPATIENT
Start: 2024-07-06 | End: 2024-07-07 | Stop reason: HOSPADM

## 2024-07-06 RX ORDER — DEXTROMETHORPHAN HYDROBROMIDE, GUAIFENESIN 10; 100 MG/5ML; MG/5ML
5 LIQUID ORAL EVERY 6 HOURS
COMMUNITY

## 2024-07-06 RX ORDER — OLANZAPINE 10 MG/2ML
10 INJECTION, POWDER, FOR SOLUTION INTRAMUSCULAR 2 TIMES DAILY PRN
Status: DISCONTINUED | OUTPATIENT
Start: 2024-07-06 | End: 2024-07-07 | Stop reason: HOSPADM

## 2024-07-06 RX ORDER — ONDANSETRON 4 MG/1
4-8 TABLET, ORALLY DISINTEGRATING ORAL 3 TIMES DAILY
COMMUNITY
Start: 2024-07-02

## 2024-07-06 RX ORDER — LIDOCAINE 40 MG/G
CREAM TOPICAL 3 TIMES DAILY PRN
Status: DISCONTINUED | OUTPATIENT
Start: 2024-07-06 | End: 2024-07-07 | Stop reason: HOSPADM

## 2024-07-06 RX ORDER — ARIPIPRAZOLE 5 MG/1
2.5 TABLET ORAL DAILY
COMMUNITY

## 2024-07-06 RX ORDER — SERTRALINE HYDROCHLORIDE 100 MG/1
200 TABLET, FILM COATED ORAL DAILY
Status: DISCONTINUED | OUTPATIENT
Start: 2024-07-07 | End: 2024-07-07 | Stop reason: HOSPADM

## 2024-07-06 RX ORDER — POLYETHYLENE GLYCOL 3350 17 G/17G
17 POWDER, FOR SOLUTION ORAL 3 TIMES DAILY
COMMUNITY

## 2024-07-06 RX ORDER — MELOXICAM 7.5 MG/1
7.5 TABLET ORAL DAILY
Status: DISCONTINUED | OUTPATIENT
Start: 2024-07-07 | End: 2024-07-07 | Stop reason: HOSPADM

## 2024-07-06 RX ORDER — LIDOCAINE 50 MG/G
OINTMENT TOPICAL 3 TIMES DAILY PRN
Status: DISCONTINUED | OUTPATIENT
Start: 2024-07-06 | End: 2024-07-06

## 2024-07-06 RX ORDER — XYLITOL/YERBA SANTA
3 AEROSOL, SPRAY WITH PUMP (ML) MUCOUS MEMBRANE
COMMUNITY
Start: 2024-05-08

## 2024-07-06 RX ORDER — ESZOPICLONE 1 MG/1
1 TABLET, FILM COATED ORAL
Status: DISCONTINUED | OUTPATIENT
Start: 2024-07-06 | End: 2024-07-07 | Stop reason: HOSPADM

## 2024-07-06 RX ORDER — GABAPENTIN 600 MG/1
600 TABLET ORAL 4 TIMES DAILY PRN
Status: DISCONTINUED | OUTPATIENT
Start: 2024-07-06 | End: 2024-07-07 | Stop reason: HOSPADM

## 2024-07-06 RX ORDER — MIRTAZAPINE 7.5 MG/1
7.5 TABLET, FILM COATED ORAL AT BEDTIME
Status: DISCONTINUED | OUTPATIENT
Start: 2024-07-06 | End: 2024-07-07 | Stop reason: HOSPADM

## 2024-07-06 RX ORDER — PREDNISONE 10 MG/1
TABLET ORAL
COMMUNITY

## 2024-07-06 RX ORDER — POLYETHYLENE GLYCOL 3350 17 G/17G
17 POWDER, FOR SOLUTION ORAL 3 TIMES DAILY
Status: DISCONTINUED | OUTPATIENT
Start: 2024-07-06 | End: 2024-07-07 | Stop reason: HOSPADM

## 2024-07-06 RX ORDER — CYCLOBENZAPRINE HCL 10 MG
10 TABLET ORAL 3 TIMES DAILY
COMMUNITY

## 2024-07-06 RX ORDER — LANOLIN ALCOHOL/MO/W.PET/CERES
3-9 CREAM (GRAM) TOPICAL
COMMUNITY

## 2024-07-06 RX ORDER — QUETIAPINE FUMARATE 50 MG/1
200 TABLET, EXTENDED RELEASE ORAL AT BEDTIME
Status: DISCONTINUED | OUTPATIENT
Start: 2024-07-06 | End: 2024-07-07 | Stop reason: HOSPADM

## 2024-07-06 RX ORDER — CARBOXYMETHYLCELLULOSE SODIUM 5 MG/ML
1 SOLUTION/ DROPS OPHTHALMIC 3 TIMES DAILY PRN
Status: DISCONTINUED | OUTPATIENT
Start: 2024-07-06 | End: 2024-07-07 | Stop reason: HOSPADM

## 2024-07-06 RX ORDER — SUMATRIPTAN 50 MG/1
50 TABLET, FILM COATED ORAL
COMMUNITY
Start: 2024-05-20

## 2024-07-06 RX ORDER — FLUOROURACIL 50 MG/G
CREAM TOPICAL DAILY PRN
Status: DISCONTINUED | OUTPATIENT
Start: 2024-07-06 | End: 2024-07-07 | Stop reason: HOSPADM

## 2024-07-06 RX ORDER — BACLOFEN 10 MG/1
10 TABLET ORAL 3 TIMES DAILY
COMMUNITY
Start: 2024-05-29

## 2024-07-06 RX ORDER — MELOXICAM 7.5 MG/1
7.5 TABLET ORAL DAILY
COMMUNITY
Start: 2024-04-12 | End: 2024-07-11

## 2024-07-06 RX ORDER — METHYLPREDNISOLONE 4 MG
TABLET, DOSE PACK ORAL SEE ADMIN INSTRUCTIONS
COMMUNITY
End: 2024-07-06

## 2024-07-06 RX ORDER — FAMOTIDINE 20 MG/1
20 TABLET, FILM COATED ORAL 2 TIMES DAILY PRN
COMMUNITY
Start: 2024-07-01

## 2024-07-06 RX ORDER — FLUOROURACIL 50 MG/G
CREAM TOPICAL DAILY
COMMUNITY
Start: 2024-03-22

## 2024-07-06 RX ORDER — QUETIAPINE 200 MG/1
1 TABLET, FILM COATED, EXTENDED RELEASE ORAL AT BEDTIME
COMMUNITY
Start: 2024-01-15

## 2024-07-06 RX ORDER — CAPSAICIN 0.025 %
CREAM (GRAM) TOPICAL 3 TIMES DAILY
COMMUNITY
Start: 2023-08-18

## 2024-07-06 RX ORDER — HYDROXYZINE HYDROCHLORIDE 25 MG/1
1 TABLET, FILM COATED ORAL EVERY 8 HOURS PRN
COMMUNITY
Start: 2023-06-30

## 2024-07-06 RX ORDER — FAMOTIDINE 20 MG/1
20 TABLET, FILM COATED ORAL 2 TIMES DAILY PRN
Status: DISCONTINUED | OUTPATIENT
Start: 2024-07-06 | End: 2024-07-07 | Stop reason: HOSPADM

## 2024-07-06 RX ADMIN — QUETIAPINE FUMARATE 50 MG: 50 TABLET ORAL at 21:07

## 2024-07-06 RX ADMIN — MIRTAZAPINE 7.5 MG: 7.5 TABLET, FILM COATED ORAL at 21:05

## 2024-07-06 RX ADMIN — CLONIDINE HYDROCHLORIDE 0.1 MG: 0.1 TABLET ORAL at 21:05

## 2024-07-06 RX ADMIN — BACLOFEN 10 MG: 10 TABLET ORAL at 21:04

## 2024-07-06 RX ADMIN — MONTELUKAST 10 MG: 10 TABLET, FILM COATED ORAL at 21:05

## 2024-07-06 RX ADMIN — QUETIAPINE FUMARATE 200 MG: 50 TABLET, EXTENDED RELEASE ORAL at 21:05

## 2024-07-06 ASSESSMENT — ACTIVITIES OF DAILY LIVING (ADL)
ADLS_ACUITY_SCORE: 35

## 2024-07-06 NOTE — ED TRIAGE NOTES
"Pt reports feeling agitated and anxious for the past 2 days. Pt has hx of alcohol and THC use, and has stopped using for approx 1 month. Pt currently feels depressed and would like help \"finding better outlet\"     Triage Assessment (Adult)       Row Name 07/06/24 4202          Triage Assessment    Airway WDL WDL        Respiratory WDL    Respiratory WDL WDL        Cardiac WDL    Cardiac WDL WDL        Cognitive/Neuro/Behavioral WDL    Cognitive/Neuro/Behavioral WDL X;mood/behavior     Mood/Behavior agitated                     "

## 2024-07-06 NOTE — PROGRESS NOTES
Triage & Transition Services, Extended Care     Client Name: Alex Carvajal    Date: July 6, 2024    Patient was seen yes  Mode of Assessment: In person    Service Type: attended group session  Session Start Time:  0515    Session End Time: 0535  Session Length: 20  Site Location: [facility] EMERGENCY DEPT                             [id number]  Total Number ofAttendees: 4  Topic: emotions/expression, leisure exploration/use of leisure time, self-care activities, structured socialization   Response: able to recall/repeat info presented, cooperative with task, discussed personal experience with topic, listened actively, other (see comments) (Pt left group to use restroom, joined a few min late.)     JOÃO Jama   Licensed Mental Health Professional (LMHP), Extended Care  [phone]

## 2024-07-06 NOTE — CONSULTS
Diagnostic Evaluation Consultation  Crisis Assessment    Patient Name: Alex Carvajal  Age:  23 year old  Legal Sex: male  Gender Identity: male  Pronouns:   Race: White  Ethnicity: Not  or   Language: English      Patient was assessed: In person   Crisis Assessment Start Date: 07/06/24  Crisis Assessment Start Time: 1820  Crisis Assessment Stop Time: 1840  Patient location: [facility] EMERGENCY DEPT [id number]    Referral Data and Chief Complaint  Alex Carvajal presents to the ED with family/friends. Patient is presenting to the ED for the following concerns: Anxiety.   Factors that make the mental health crisis life threatening or complex are:  Patient stated that he asked his father to take him to the ED today due to increased anxiety. Pt stated that for the last few days, he has been experiencing agitation and irritation. He reported feeling restless and struggles with chronic insomnia. With medication, he struggles to stay asleep. He has gotten an average of four hours of sleep per night in the last two weeks. He met with his psychiatric NP last week and had lamotrigine increased, which he stated has been helpful. However, today he is seeking medications for anxiety. Pt stated that he last drank alcohol yesterday and is sober from marijuana for 80+ days. At the time of assessment, pt presented as calm and cooperative. Prior to the assessment, pt participated in a group session and ate supper. After assessment, he was requested clonidine specifically, stating he felt agitated..      Informed Consent and Assessment Methods  Explained the crisis assessment process, including applicable information disclosures and limits to confidentiality, assessed understanding of the process, and obtained consent to proceed with the assessment.  Assessment methods included conducting a formal interview with patient, review of medical records, collaboration with medical staff, and  obtaining relevant collateral information from family and community providers when available.  : done     Patient response to interventions: eager to participate  Coping skills were attempted to reduce the crisis:  medications, seeking help     History of the Crisis   Patient has a history of anxiety, depression, alcohol and marijuana use. Pt stated that he had 6 months of sobriety until he relapsed two weeks ago. His last drink was yesterday. He has been sober from marijuana for 80+ days. Pt was in a dual diagnosis OP program, but was discharged one week ago due to too many absences. Pt stated that absences were due to asthma attacks. Pt denied a history of suicide attempts. Pt stated that he does not have a therapist, but was given a referral recently by PCP. Pt is currently unemployed. He lives with his parents and brother. He stated that he finds his family supportive. He also has four guinea pigs and a cat that he enjoys taking care of. He attends AA meetings regularly, about 3 times per week. Pt stated that his mental health has been improving, but not his physical health. He reported having gained weight due to having a more sedentary lifestyle.    Brief Psychosocial History  Family:  Single, Children no  Support System:  Parent(s), Sibling(s) (parents and brothers)  Employment Status:  unemployed  Source of Income:  none  Financial Environmental Concerns:  none (lives with parents and brother)  Current Hobbies:  family functions, exercise/fitness, television/movies/videos, social media/computer activities, interaction with pets, music, outdoor activities  Barriers in Personal Life:  mental health concerns, transportation concerns    Significant Clinical History  Current Anxiety Symptoms:  anxious  Current Depression/Trauma:   (none reported)  Current Somatic Symptoms:  anxious  Current Psychosis/Thought Disturbance:  agitation  Current Eating Symptoms:  recent weight gain  Chemical Use History:  Alcohol:  Other (comments) (in recovery)  Last Use:: 07/05/24  Benzodiazepines: None  Opiates: None  Cocaine: None  Marijuana: Other (comments) (sober 80+ days)  Last Use:: 04/25/24  Other Use: None   Past diagnosis:  Anxiety Disorder, Depression  Family history:  No known history of mental health or chemical health concerns  Past treatment:  Individual therapy, Psychiatric Medication Management, Day Treatment, Primary Care  Details of most recent treatment:  Pt has a psych NP. Pt was discharged from dual diagnosis OP tx one week ago. No therapist currently, but has a referral from PCP.  Other relevant history:          Collateral Information  Is there collateral information: No          Risk Assessment  [place] Suicide Severity Rating Scale Full Clinical Version:  Suicidal Ideation  Q6 Suicide Behavior (Lifetime): no          [place] Suicide Severity Rating Scale Recent: 7/6/24  Suicidal Ideation (Recent)  Q1 Wished to be Dead (Past Month): no  Q2 Suicidal Thoughts (Past Month): no  Level of Risk per Screen: no risks indicated  Intensity of Ideation (Recent)  Frequency (Past 1 Month):  (n/a)  Duration (Past 1 Month):  (n/a)  Controllability (Past 1 Month):  (n/a)  Deterrents (Past 1 Month): Does not apply  Reasons for Ideation (Past 1 Month): Does not apply  Suicidal Behavior (Recent)  Actual Attempt (Past 3 Months): No  Has subject engaged in non-suicidal self-injurious behavior? (Past 3 Months): No  Interrupted Attempts (Past 3 Months): No  Aborted or Self-Interrupted Attempt (Past 3 Months): No  Preparatory Acts or Behavior (Past 3 Months): No    Environmental or Psychosocial Events: unemployment/underemployment, other life stressors  Protective Factors: Protective Factors: strong bond to family unit, community support, or employment, responsibilities and duties to others, including pets and children, lives in a responsibly safe and stable environment, able to access care without barriers, help seeking, good impulse  control, supportive ongoing medical and mental health care relationships, sense of importance of health and wellness, optimistic outlook - identification of future goals, reality testing ability, constructive use of leisure time, enjoyable activities, resilience    Does the patient have thoughts of harming others? Feels Like Hurting Others: no  Previous Attempt to Hurt Others: no  Current presentation:  (calm)    Is the patient engaging in sexually inappropriate behavior?           Mental Status Exam   Affect: Appropriate  Appearance: Appropriate  Attention Span/Concentration: Attentive  Eye Contact: Engaged    Fund of Knowledge: Appropriate   Language /Speech Content: Fluent  Language /Speech Volume: Normal  Language /Speech Rate/Productions: Normal  Recent Memory: Intact  Remote Memory: Intact  Mood: Normal  Orientation to Person: Yes   Orientation to Place: Yes  Orientation to Time of Day: Yes  Orientation to Date: Yes     Situation (Do they understand why they are here?): Yes  Psychomotor Behavior: Normal  Thought Content: Clear  Thought Form: Intact       Medication  Psychotropic medications:   Medication Orders - Psychiatric (From admission, onward)      None             Current Care Team  Patient Care Team:  [facility] as PCP - General    Diagnosis  Patient Active Problem List   Diagnosis Code    Major depressive disorder, recurrent episode, moderate (H) F33.1    Generalized anxiety disorder F41.1       Primary Problem This Admission  Active Hospital Problems    *Generalized anxiety disorder F41.1      Clinical Summary and Substantiation of Recommendations   Patient presented to the ED with concerns related to anxiety. He reported feeling more agitated, irritable, and restless that last few days. He has chronic insomnia. Pt stated that he had 6 months of sobriety until he relapsed two weeks ago. His last drink was yesterday. He has been sober from marijuana for 80+ days. Pt was in a dual  diagnosis OP program, but was discharged one week ago due to too many absences (asthma attacks). Pt met with his psychiatric NP last week and had lamotrigine increased, which he stated has been helpful. However, today he is seeking medications for anxiety. He is also interested in referral for another MICD program. Pt stated that he does not have a therapist, but was given a referral recently by PCP. There are no safety concerns. Pt was agreeable to spend the night in [facility] if the psych provider is unable to meet with him tonight.                          Patient coping skills attempted to reduce the crisis:  medications, seeking help    Disposition  Recommended disposition: Medication Management, Individual Therapy        Reviewed case and recommendations with attending provider. Attending Name: N/A psych consult pending       Attending concurs with disposition:  (n/a)       Patient and/or validated legal guardian concurs with disposition: yes       Final disposition:  observation    Legal status on admission: Voluntary/Patient has signed consent for treatment    Assessment Details   Total duration spent with the patient: 20 min     CPT code(s) utilized: Non-Billable    JOÃO Ibarra, Psychotherapist  [facility] - Triage & Transition Services  Callback: [phone]

## 2024-07-06 NOTE — ED NOTES
[facility]  ED to EMPATH Checklist:      Goal for EMPATH: Depression management    Current Behavior: Cooperative    Safety Concerns: None    Legal Hold Status: Voluntary    Medically Cleared by ED provider: Yes    Patient Therapeutically Searched: N/A    Belongings: Remain with patient    Independent Ambulation at Baseline: Yes/No: Yes    Participates in Care/Conversation: Yes/No: Yes    Patient Informed about EMPATH: Yes/No: Yes    DEC: Ordered and pending    Patient Ready to be Transferred to EMPATH? Yes/No: Yes

## 2024-07-06 NOTE — ED PROVIDER NOTES
"  Emergency Department Note      History of Present Illness     Chief Complaint   Anxiety and Psychiatric Evaluation    HPI   Alex Carvajal is a 23 year old male with a history of JONATHAN, MDD, and alcohol use disorder who presents to the ED for evaluation of anxiety. The patient reports worsening restlessness and anxiety that he describes as a \"clawing\" sensation for about one week that has been worsening. He wants to try a new medication for anxiety. He mentions that he was seen for anxiety a week ago by a psychiatric nurse practitioner, who increased his dose of lamotrigine. He also mentions that he is in Alcoholics Anonymous and has previously abused THC. He states he takes sertraline for panic attacks. He denies any recent medication changes, thoughts of self harm, alcohol use, or missed medication doses.    Independent Historian   None    Review of External Notes   None.    Past Medical History     Medical History and Problem List   MDD  JONATHAN  Asthma  Alcohol use disorder  Migraine  Obesity  Panic disorder  Dyslipidemia    Medications   Albuterol  Prednisone  Lamotrigine  Sertraline  Clonidine  Baclofen  Aripiprazole     Surgical History   The patient does not have any pertinent past surgical history.   Physical Exam     Patient Vitals for the past 24 hrs:   BP Temp Temp src Pulse Resp SpO2 Height Weight   07/06/24 1643 134/87 97.7  F (36.5  C) Oral 115 -- 99 % 1.702 m (5' 7\") 98.7 kg (217 lb 11.2 oz)   07/06/24 1447 139/85 -- -- 119 16 100 % -- --     Physical Exam  General: No acute distress  Head: No obvious trauma to head.  Ears, Nose, Throat:  External ears normal.  Nose normal.  No pharyngeal erythema, swelling or exudate.  Midline uvula. Moist mucus membranes.  Eyes:  Conjunctivae clear.   Neck: Normal range of motion.  Neck supple.   CV: Regular rate and rhythm.  No murmurs.      Respiratory: Effort normal and breath sounds normal.  No wheezing or crackles.   Gastrointestinal: Soft.  No " distension. There is no tenderness.  There is no rigidity, no rebound and no guarding.   Musculoskeletal: Normal range of motion.  Non tender extremities to palpations. No lower extremity edema  Neuro: Alert. Moving all extremities appropriately.  Normal speech.    Skin: Skin is warm and dry.  No rash noted.   Psych: Denies HI and SI    Diagnostics     Lab Results   Labs Ordered and Resulted from Time of ED Arrival to Time of ED Departure - No data to display    Imaging   No orders to display     Independent Interpretation   None    ED Course      Medications Administered   Medications   albuterol (PROVENTIL) neb solution 2.5 mg (has no administration in time range)   baclofen (LIORESAL) tablet 10 mg (has no administration in time range)   capsaicin (ZOSTRIX) 0.025 % cream (has no administration in time range)   cloNIDine (CATAPRES) tablet 0.1 mg (has no administration in time range)   diclofenac (VOLTAREN) 1 % topical gel 2 g (has no administration in time range)   eszopiclone (LUNESTA) tablet 1 mg (has no administration in time range)   famotidine (PEPCID) tablet 20 mg (has no administration in time range)   fluorouracil (EFUDEX) 5 % cream (has no administration in time range)   fluticasone (FLONASE) 50 MCG/ACT spray 1 spray (has no administration in time range)   gabapentin (NEURONTIN) tablet 600 mg (has no administration in time range)   hydrOXYzine HCl (ATARAX) tablet 25-50 mg (has no administration in time range)   lamoTRIgine (LaMICtal) tablet 150 mg (has no administration in time range)   meloxicam (MOBIC) tablet 7.5 mg (has no administration in time range)   mirtazapine (REMERON) tablet TABS 7.5 mg (has no administration in time range)   montelukast (SINGULAIR) tablet 10 mg (has no administration in time range)   ondansetron (ZOFRAN ODT) ODT tab 4-8 mg (has no administration in time range)   polyethylene glycol (MIRALAX) Packet 17 g (has no administration in time range)   QUEtiapine (SEROquel) tablet 50 mg  (has no administration in time range)   QUEtiapine ER (SEROquel XR) 24 hr tablet 200 mg (has no administration in time range)   sertraline (ZOLOFT) tablet 200 mg (has no administration in time range)   SUMAtriptan (IMITREX) tablet 50 mg (has no administration in time range)   OLANZapine zydis (zyPREXA) ODT tab 5 mg (has no administration in time range)     Or   OLANZapine (zyPREXA) injection 10 mg (has no administration in time range)   carboxymethylcellulose PF (REFRESH PLUS) 0.5 % ophthalmic solution 1 drop (has no administration in time range)   lidocaine (LMX4) cream (has no administration in time range)       Procedures   Procedures     Discussion of Management   None    ED Course   ED Course as of 07/06/24 2047   Sat Jul 06, 2024   1501 I obtained history and examined the patient as noted above.        Optional/Additional Documentation  None    Medical Decision Making / Diagnosis     CMS Diagnoses: None    MIPS       None    MDM   Alex Carvajal is a 23 year old male presenting with worsening anxiety.  He is calm and cooperative and denies suicidal ideation.  No alcohol or substance use.  He has been taking all of his medications as prescribed.  I believe he would be a good candidate for the empath unit.  He is agreeable to going to empath to getting further assistance.  He remains calm and cooperative and is transferred to the empath unit.        Disposition   The patient was transferred to [facility].     Diagnosis     ICD-10-CM    1. Generalized anxiety disorder  F41.1       2. Major depressive disorder, recurrent episode, moderate (H)  F33.1       3. Alcohol use disorder  F10.90       4. Cannabis use disorder, severe, in early remission, dependence (H)  F12.21           Scribe Disclosure:  I, Tiffany Dunaway, am serving as a scribe at 3:17 PM on 7/6/2024 to document services personally performed by Rick Cruz MD based on my observations and the provider's statements to me.     Scribe  Disclosure:  I, Felicia Brock, am serving as a scribe  at 6:08 PM on 7/6/2024 to document services personally performed by Rick Cruz MD based on my observations and the provider's statements to me.       Rick Cruz MD  07/06/24 2048

## 2024-07-06 NOTE — PROGRESS NOTES
Patient is 23  year old JONATHAN, MDD, and alcohol use disorder. Pt  presented from ED due to having increasing anxiety, and restless. Reports feeling agitation, restlessness, feeling like something bad is going to happen. He thinks physical stress is due to his asthma flare -up. PT denies SI/HI. Pt states one of his stressors are family dynamics, He is been worried much of how he looks physically. He is been taking Zoloft and Clonidine for anxiety, but he think is not helping much. He denies using any substance use, or drinking recently.     Nursing and risk assessments completed. Assessments reviewed with LMHP and physician. Admission information reviewed with patient. Patient given a tour of [facility] and instructions on using the facility. Questions regarding [facility] addressed. Pt safety search completed.

## 2024-07-07 NOTE — PLAN OF CARE
Alex Carvajal  July 6, 2024  Plan of Care Hand-off Note     Patient Care Path: observation    Plan for Care:   Patient presented to the ED with concerns related to anxiety. He reported feeling more agitated, irritable, and restless that last few days. He has chronic insomnia. Pt stated that he had 6 months of sobriety until he relapsed two weeks ago. His last drink was yesterday. He has been sober from marijuana for 80+ days. Pt was in a dual diagnosis OP program, but was discharged one week ago due to too many absences (asthma attacks). Pt met with his psychiatric NP last week and had lamotrigine increased, which he stated has been helpful.     Identified Goals and Safety Issues:    Patient is seeking medications for anxiety. He is also interested in referral for another MICD program. Pt stated that he does not have a therapist, but was given a referral recently by PCP. There are no safety concerns. Pt was agreeable to spend the night in [facility] if the psych provider is unable to meet with him tonight.                   Legal Status: Legal Status at Admission: Voluntary/Patient has signed consent for treatment    Psychiatry Consult: Pending       Updated RN regarding plan of care.           JOÃO Ibarra

## 2024-07-07 NOTE — PHARMACY-ADMISSION MEDICATION HISTORY
Pharmacist Admission Medication History    Admission medication history is complete. The information provided in this note is only as accurate as the sources available at the time of the update.    Information Source(s): [place]/[employer] and [place] PDMP  via N/A    Pertinent Information:   - Pharmacy consulted to review medication list, specifically for benzodiazepine and steroids  - Patient has filled no benzodiazepine prescriptions this year  - Patient has filled multiple steroid prescriptions since 5/11/24 from different providers. There have been ~12 steroid free days based on days supply filled since 5/11/24. Most recently, prednisone was prescribed on 6/27/24 for a 12 day supply. A medrol dose pack was prescribed on 6/26/24, however, the patient was supposed to discontinue this per the 6/27/24 provider note with prednisone prescription.    Changes made to PTA medication list:  Added: pulmicort, cyclobenzaprine, prednisone  Deleted: None  Changed: None    Allergies reviewed with patient and updates made in EHR: no    Medication History Completed By: Steve Lerner RPH 7/6/2024 9:27 PM    PTA Med List   Medication Sig Last Dose    acetaminophen-codeine (TYLENOL #2) 300-15 MG per tablet Take 1 tablet by mouth daily as needed for pain 7/5/2024    albuterol (PROVENTIL) (2.5 MG/3ML) 0.083% neb solution Take 1 vial (2.5 mg) by nebulization every 6 hours as needed for shortness of breath, wheezing or cough 7/6/2024    amitriptyline (ELAVIL) 25 MG tablet Take 25 mg by mouth at bedtime 7/5/2024    ARIPiprazole (ABILIFY) 5 MG tablet Take 2.5 mg by mouth daily 7/6/2024    Artificial Saliva (MOUTH KOTE) SOLN Take 3 sprays by mouth 7/6/2024    baclofen (LIORESAL) 10 MG tablet Take 10 mg by mouth 3 times daily 7/6/2024    budesonide (PULMICORT FLEXHALER) 180 MCG/ACT inhaler Inhale 1 puff into the lungs 2 times daily     capsaicin (ZOSTRIX) 0.025 % external cream 3 times daily Past Week    CHEST CONGESTION RELIEF DM   MG/5ML syrup Take 5 mLs by mouth every 6 hours Past Week    cloNIDine (CATAPRES) 0.1 MG tablet Take 1 tablet by mouth 4 times daily 7/6/2024    cyclobenzaprine (FLEXERIL) 10 MG tablet Take 10 mg by mouth 3 times daily     dextran 70-hypromellose (TEARS NATURALE FREE PF) 0.1-0.3 % ophthalmic solution Apply 1 drop to eye 3 times daily as needed Past Week    diclofenac (VOLTAREN) 1 % topical gel Apply 2 g topically 4 times daily Past Week    eszopiclone (LUNESTA) 1 MG tablet Take 1 mg by mouth nightly as needed 7/5/2024    famotidine (PEPCID) 20 MG tablet Take 20 mg by mouth 2 times daily as needed 7/6/2024    fluorouracil (EFUDEX) 5 % external cream Apply topically daily 7/5/2024    fluticasone (FLONASE) 50 MCG/ACT nasal spray Spray 1 spray into both nostrils daily 7/6/2024    gabapentin (NEURONTIN) 600 MG tablet Take 900 mg by mouth 4 times daily as needed for neuropathic pain 7/6/2024    hydrOXYzine HCl (ATARAX) 25 MG tablet Take 1 tablet by mouth every 8 hours as needed 7/5/2024    lamoTRIgine (LAMICTAL) 150 MG tablet Take 1 tablet by mouth daily 7/5/2024    lidocaine (XYLOCAINE) 5 % external ointment Apply topically 3 times daily Past Week    melatonin 3 MG tablet Take 3-9 mg by mouth nightly as needed for sleep Past Week    meloxicam (MOBIC) 7.5 MG tablet Take 7.5 mg by mouth daily 7/5/2024    mirtazapine (REMERON) 15 MG tablet Take 7.5 mg by mouth daily 7/5/2024    montelukast (SINGULAIR) 10 MG tablet Take 10 mg by mouth at bedtime 7/5/2024    mupirocin (BACTROBAN) 2 % external ointment Apply topically 3 times daily     ondansetron (ZOFRAN ODT) 4 MG ODT tab Take 4-8 mg by mouth 3 times daily 7/6/2024    polyethylene glycol (MIRALAX) 17 GM/Dose powder Take 17 g by mouth 3 times daily 7/5/2024    predniSONE (DELTASONE) 10 MG tablet Take 4 Tablets (40 mg) by mouth once daily with a meal for 3 days, THEN 3 Tablets (30 mg) once daily with a meal for 3 days, THEN 2 Tablets (20 mg) once daily with a meal for 3  days, THEN 1 Tablet (10 mg) once daily with a meal for 3 days.     QUEtiapine (SEROQUEL) 50 MG tablet Take 1 tablet by mouth at bedtime 7/5/2024    QUEtiapine ER (SEROQUEL XR) 200 MG 24 hr tablet Take 1 tablet by mouth at bedtime 7/5/2024    salicylic acid 17 % LIQD Soak wart in warm water for 5 minutes, then dry area thoroughly and apply 1 drop of this liquid to cover the wart. Subsequently let the area dry. Continue this one time daily until the wart is gone, or for a max of 12-weeks (whichever is first) Past Week    Semaglutide-Weight Management (WEGOVY) 1 MG/0.5ML pen Inject 1 mg Subcutaneous once a week 7/3/2024    sertraline (ZOLOFT) 100 MG tablet Take 200 mg by mouth daily 7/6/2024    SUMAtriptan (IMITREX) 50 MG tablet Take 50 mg by mouth 7/5/2024    VENTOLIN  (90 Base) MCG/ACT inhaler Inhale 1-2 puffs into the lungs every 4 hours as needed for shortness of breath or wheezing 7/6/2024

## 2024-07-07 NOTE — PROGRESS NOTES
Patient left AMA, after having a conversation with staff nurse, that wanted to get Klonopin for his anxiety, and Prednisone for his asthma. Pt got angry and was insisting that he wants to get prednisone for his asthma. When he was offered a nebulizer he refuse saying that it doesn't help him. Then Pt proceeded,  saying he was going to go without seeing the provider in the morning.  Patient has filled multiple steroid prescription since 5/11/24 from different providers. Pharmacy consult was placed for med list. He was seen by the LM, but decline to wait to see the provider in the morning.

## 2024-07-07 NOTE — PROGRESS NOTES
Patient approached the nursing station requesting something for anxiety ( clonidine). He states he is feeling more and more anxious. Headphones, and I pad was provided upon request.

## 2024-07-07 NOTE — PROGRESS NOTES
"Patient came back to the nursing station requesting klonopin for anxiety, stating he is having a hard time breathing, feeling very anxious. writer I told Pt that I will get his albuterol inhaler, he refusing saying\" I will would like to go back to ED maybe.  "

## 2024-07-09 ENCOUNTER — PATIENT OUTREACH (OUTPATIENT)
Dept: CARE COORDINATION | Facility: CLINIC | Age: 24
End: 2024-07-09
Payer: COMMERCIAL

## 2024-07-09 NOTE — PROGRESS NOTES
Connected Care Resource Center:   [facility] Care Resource Center Contact  [facility]/Voicemail     Clinical Data: Post-Discharge Outreach     Outreach attempted x 2.  Left message on patient's voicemail, providing [facility]'s central phone number of [phone] ([phone]) for questions/concerns and/or to schedule an appt with an [facility] provider, if they do not have a PCP.      Plan:  [facility] will do no further outreaches at this time.       ALEAH Recinos  Connected Care Resource Center, [facility]    *Connected Care Resource Team does NOT follow patient ongoing. Referrals are identified based on internal discharge reports and the outreach is to ensure patient has an understanding of their discharge instructions.

## 2024-07-23 ENCOUNTER — HOSPITAL ENCOUNTER (EMERGENCY)
Facility: CLINIC | Age: 24
Discharge: HOME | End: 2024-07-23
Attending: EMERGENCY MEDICINE | Admitting: EMERGENCY MEDICINE
Payer: COMMERCIAL

## 2024-07-23 VITALS
DIASTOLIC BLOOD PRESSURE: 56 MMHG | SYSTOLIC BLOOD PRESSURE: 116 MMHG | BODY MASS INDEX: 31.39 KG/M2 | HEIGHT: 67 IN | WEIGHT: 200 LBS | TEMPERATURE: 97.8 F | RESPIRATION RATE: 20 BRPM | HEART RATE: 83 BPM | OXYGEN SATURATION: 99 %

## 2024-07-23 DIAGNOSIS — J02.9 SORE THROAT: ICD-10-CM

## 2024-07-23 DIAGNOSIS — H92.02 LEFT EAR PAIN: ICD-10-CM

## 2024-07-23 LAB — GROUP A STREP BY PCR: NOT DETECTED

## 2024-07-23 PROCEDURE — 87651 STREP A DNA AMP PROBE: CPT

## 2024-07-23 PROCEDURE — 99283 EMERGENCY DEPT VISIT LOW MDM: CPT

## 2024-07-23 RX ORDER — CETIRIZINE HYDROCHLORIDE, PSEUDOEPHEDRINE HYDROCHLORIDE 5; 120 MG/1; MG/1
1 TABLET, FILM COATED, EXTENDED RELEASE ORAL 2 TIMES DAILY
Qty: 14 TABLET | Refills: 0 | Status: SHIPPED | OUTPATIENT
Start: 2024-07-23 | End: 2024-07-30

## 2024-07-23 ASSESSMENT — ACTIVITIES OF DAILY LIVING (ADL)
ADLS_ACUITY_SCORE: 35
ADLS_ACUITY_SCORE: 35

## 2024-07-23 ASSESSMENT — COLUMBIA-SUICIDE SEVERITY RATING SCALE - C-SSRS
6. HAVE YOU EVER DONE ANYTHING, STARTED TO DO ANYTHING, OR PREPARED TO DO ANYTHING TO END YOUR LIFE?: NO
1. IN THE PAST MONTH, HAVE YOU WISHED YOU WERE DEAD OR WISHED YOU COULD GO TO SLEEP AND NOT WAKE UP?: NO
2. HAVE YOU ACTUALLY HAD ANY THOUGHTS OF KILLING YOURSELF IN THE PAST MONTH?: NO

## 2024-07-24 NOTE — ED PROVIDER NOTES
Emergency Department Note      History of Present Illness     Chief Complaint   Otalgia      HPI   Alex Carvajal is a 24 year old male presents emergency department for evaluation of left ear pain.  Past medical history includes generalized anxiety disorder and major depressive disorder.  Patient reports that he still experiencing left ear pain for approximately 1 week.  He also reports that she went swimming approximately 5 days ago.  Patient states that his ear pain does not radiate anywhere and primarily feels like it is in his inner ear.  States his pain is like a dull ache.  Patient does also endorse a mild sore throat and increased nasal congestion.     Independent Historian   None    Review of External Notes   None     Past Medical History     Medical History and Problem List   No past medical history on file.    Medications   cetirizine-pseudoePHEDrine ER (ZYRTEC-D) 5-120 MG 12 hr tablet  albuterol (PROVENTIL) (2.5 MG/3ML) 0.083% neb solution  amitriptyline (ELAVIL) 25 MG tablet  ARIPiprazole (ABILIFY) 5 MG tablet  Artificial Saliva (MOUTH KOTE) SOLN  baclofen (LIORESAL) 10 MG tablet  budesonide (PULMICORT FLEXHALER) 180 MCG/ACT inhaler  capsaicin (ZOSTRIX) 0.025 % external cream  CHEST CONGESTION RELIEF DM  MG/5ML syrup  cloNIDine (CATAPRES) 0.1 MG tablet  cyclobenzaprine (FLEXERIL) 10 MG tablet  dextran 70-hypromellose (TEARS NATURALE FREE PF) 0.1-0.3 % ophthalmic solution  diclofenac (VOLTAREN) 1 % topical gel  eszopiclone (LUNESTA) 1 MG tablet  famotidine (PEPCID) 20 MG tablet  fluorouracil (EFUDEX) 5 % external cream  fluticasone (FLONASE) 50 MCG/ACT nasal spray  gabapentin (NEURONTIN) 600 MG tablet  hydrOXYzine HCl (ATARAX) 25 MG tablet  lamoTRIgine (LAMICTAL) 150 MG tablet  lidocaine (XYLOCAINE) 5 % external ointment  melatonin 3 MG tablet  meloxicam (MOBIC) 7.5 MG tablet  mirtazapine (REMERON) 15 MG tablet  montelukast (SINGULAIR) 10 MG tablet  mupirocin (BACTROBAN) 2 % external  "ointment  ondansetron (ZOFRAN ODT) 4 MG ODT tab  polyethylene glycol (MIRALAX) 17 GM/Dose powder  predniSONE (DELTASONE) 10 MG tablet  QUEtiapine (SEROQUEL) 50 MG tablet  QUEtiapine ER (SEROQUEL XR) 200 MG 24 hr tablet  salicylic acid 17 % LIQD  sertraline (ZOLOFT) 100 MG tablet  SUMAtriptan (IMITREX) 50 MG tablet  VENTOLIN  (90 Base) MCG/ACT inhaler        Surgical History   No past surgical history on file.    Physical Exam     Patient Vitals for the past 24 hrs:   BP Temp Temp src Pulse Resp SpO2 Height Weight   07/23/24 2200 116/56 97.8  F (36.6  C) Oral 83 20 99 % 1.702 m (5' 7\") 90.7 kg (200 lb)     Physical Exam  General: Awake, alert, non-toxic.  Head:  Scalp is NC/AT  Eyes:  Conjunctiva normal, PERRL  ENT:  The external nose and ears are normal.     Oropharynx clear, uvula midline.    Left Ear:  Pinna, Tragus, and external canal nontender to palpation.  Ear canal is clear and tympanic membranes with good cone of light.  Acuity good to whispered voice.   Right Ear:  Pinna, tragus, and external canal nontender to palpation.  Ear canal is clear independent membranes with good cone of light.  Acuity good to whispered voice.     Neck:  Normal range of motion without rigidity.  CV:  Regular rate and rhythm    No pathologic murmur, rubs, or gallops.  Resp:  Breath sounds are clear bilaterally    Non-labored, no retractions or accessory muscle use  Abdomen: Abdomen is soft, no distension, no tenderness, no masses. No CVA tenderness.  MS:  No lower extremity edema/swelling. No midline cervical, thoracic, or lumbar tenderness.  Extremities without joint swelling or redness.  Skin:  Warm and dry, No rash or lesions noted.  Neuro:  Alert and oriented.  GCS 15 Moves all extremities normal.  No facial asymmetry. Gait normal.  Psych: Awake. Alert. Normal affect. Appropriate interactions.     Diagnostics     Lab Results   Labs Ordered and Resulted from Time of ED Arrival to Time of ED Departure   GROUP A " STREPTOCOCCUS PCR THROAT SWAB - Normal       Result Value    Group A strep by PCR Not Detected         Imaging   No orders to display       EKG       Independent Interpretation   None    ED Course      Medications Administered   Medications - No data to display    Procedures   Procedures     Discussion of Management   None    ED Course        Optional/Additional Documentation  None    Medical Decision Making / Diagnosis     CMS Diagnoses: None    MIPS       None    MDM     24 year old male who presents with .  Patient is well appearing and non-toxic.  The patient is afebrile with non-concerning vitals in the emergency department.  History and exam showed no evidence of serious bacterial infection.  Lungs are clear, no signs of respiratory distress, and oxygen saturation is normal making pneumonia unlikely.  Considered more concerning cardiopulmonary etiologies such as ACS, PE, myocarditis, CHF etc and I feel these are very unlikely. No signs of more serious deep space infection of throat or neck.    This most likely represents a viral URI.  Symptomatic care was discussed with the patient. The patient will follow up with PCP in 2-3 days for re-check.  Reasons to return to the emergency department were discussed including fever >100.4, severe headache, neck stiffness, chest pain, difficulty breathing, or other new or worsening symptoms.     Disposition   The patient was discharged.     Diagnosis     ICD-10-CM    1. Left ear pain  H92.02       2. Sore throat  J02.9            Discharge Medications   New Prescriptions    CETIRIZINE-PSEUDOEPHEDRINE ER (ZYRTEC-D) 5-120 MG 12 HR TABLET    Take 1 tablet by mouth 2 times daily for 7 days         ARSEN Mejia CNP, [place], APRN CNP  07/23/24 6683

## 2024-07-24 NOTE — ED TRIAGE NOTES
Patient here  with left ear pain which started 2 days ago     Triage Assessment (Adult)       Row Name 07/23/24 3275          Triage Assessment    Airway WDL WDL        Respiratory WDL    Respiratory WDL WDL        Skin Circulation/Temperature WDL    Skin Circulation/Temperature WDL WDL        Cardiac WDL    Cardiac WDL WDL        Peripheral/Neurovascular WDL    Peripheral Neurovascular WDL WDL        Cognitive/Neuro/Behavioral WDL    Cognitive/Neuro/Behavioral WDL WDL

## 2024-07-24 NOTE — ED PROVIDER NOTES
"ED APC SUPERVISION NOTE:   I evaluated this patient in conjunction with Yamil Aldana APRN CNP.  I have participated in the care of the patient and personally performed key elements of the history, exam, and medical decision making.      HPI:   Alex Carvajal is a 24 year old male with history of JONATHAN and MDD who presents for left ear pain. Patient states he has had ongoing left ear pain persisting for about a week, exacerbated after swimming five days ago. The pain is localized to his inner ear, described as a dull ache without radiation. He endorses mild sore throat and increased nasal congestion.      Independent Historian:   None    Review of External Notes: None      EXAM:   /56   Pulse 83   Temp 97.8  F (36.6  C) (Oral)   Resp 20   Ht 1.702 m (5' 7\")   Wt 90.7 kg (200 lb)   SpO2 99%   BMI 31.32 kg/m     General: Alert, interactive   Head:  Scalp is atraumatic  Eyes:  The pupils are equal, round, and reactive to light    EOM's intact    No scleral icterus  ENT:      Nose:  The external nose is normal  Ears:  Bilateral middle ear effusion   Mouth/Throat: Mucus membranes are moist     No tonsillar hypertrophy or exudate     Pharyngeal erythema  Neck:  Normal range of motion.      There is no rigidity.    Trachea is in the midline         CV:  Regular rate and rhythm    No murmur   Resp:  Breath sounds are clear bilaterally    Non-labored, no retractions or accessory muscle use        Psych: Awake. Alert.  Normal affect.      Appropriate interactions.    Labs Ordered and Resulted from Time of ED Arrival to Time of ED Departure   GROUP A STREPTOCOCCUS PCR THROAT SWAB - Normal       Result Value    Group A strep by PCR Not Detected          Independent Interpretation (X-rays, CTs, rhythm strip):  None    Consultations/Discussion of Management or Tests:  None     Social Determinants of Health affecting care:   None     MEDICAL DECISION MAKING/ASSESSMENT AND PLAN:   Following presentation " history and physical examination are performed, the above workup was undertaken.  There is no signs of acute streptococcal pharyngitis.  Patient's ears demonstrate middle ear effusions but no signs of an overt infection either otitis media or otitis externa and I favor a likely viral illness.  His symptoms been present for more than a week and he has no fevers or other constitutional symptoms to suggest influenza or COVID-19.  He will be discharged with supportive cares and of recommended follow-up with his primary care provider.     DIAGNOSIS:     ICD-10-CM    1. Left ear pain  H92.02       2. Sore throat  J02.9           DISPOSITION:   Patient was discharged home     Scribe Disclosure:  ICaterina, am serving as a scribe at 11:08 PM on 7/23/2024 to document services personally performed by Rene Escobar MD based on my observations and the provider's statements to me.     7/23/2024  [facility] EMERGENCY DEPT      Rene Escobar MD  07/23/24 8665

## 2024-07-24 NOTE — DISCHARGE INSTRUCTIONS
Call your doctor now or seek immediate medical care if:    You have new or worse symptoms of infection, such as:  Increased pain, warmth, or redness.  Pus draining from the area.  A fever.     You have new or worse pain near your ear, such as in your jaw or neck.   Watch closely for changes in your health, and be sure to contact your doctor if:    You have new or worse discharge coming from the ear.     You do not get better as expected.

## 2024-08-06 ENCOUNTER — HOSPITAL ENCOUNTER (EMERGENCY)
Facility: CLINIC | Age: 24
Discharge: LEFT BEFORE DISCHARGE | End: 2024-08-06
Attending: EMERGENCY MEDICINE | Admitting: EMERGENCY MEDICINE
Payer: COMMERCIAL

## 2024-08-06 VITALS
TEMPERATURE: 97.5 F | RESPIRATION RATE: 25 BRPM | DIASTOLIC BLOOD PRESSURE: 10 MMHG | SYSTOLIC BLOOD PRESSURE: 173 MMHG | OXYGEN SATURATION: 98 % | HEART RATE: 132 BPM

## 2024-08-06 DIAGNOSIS — R11.15 CYCLICAL VOMITING: ICD-10-CM

## 2024-08-06 LAB
ALBUMIN SERPL BCG-MCNC: 5 G/DL (ref 3.5–5.2)
ALP SERPL-CCNC: 103 U/L (ref 40–150)
ALT SERPL W P-5'-P-CCNC: 16 U/L (ref 0–70)
ANION GAP SERPL CALCULATED.3IONS-SCNC: 15 MMOL/L (ref 7–15)
AST SERPL W P-5'-P-CCNC: 23 U/L (ref 0–45)
BASOPHILS # BLD AUTO: 0 10E3/UL (ref 0–0.2)
BASOPHILS NFR BLD AUTO: 0 %
BILIRUB SERPL-MCNC: 0.5 MG/DL
BUN SERPL-MCNC: 14.4 MG/DL (ref 6–20)
CALCIUM SERPL-MCNC: 10.2 MG/DL (ref 8.8–10.4)
CHLORIDE SERPL-SCNC: 100 MMOL/L (ref 98–107)
CREAT SERPL-MCNC: 0.69 MG/DL (ref 0.67–1.17)
EGFRCR SERPLBLD CKD-EPI 2021: >90 ML/MIN/1.73M2
EOSINOPHIL # BLD AUTO: 0 10E3/UL (ref 0–0.7)
EOSINOPHIL NFR BLD AUTO: 0 %
ERYTHROCYTE [DISTWIDTH] IN BLOOD BY AUTOMATED COUNT: 13.2 % (ref 10–15)
GLUCOSE SERPL-MCNC: 148 MG/DL (ref 70–99)
HCO3 SERPL-SCNC: 24 MMOL/L (ref 22–29)
HCT VFR BLD AUTO: 44 % (ref 40–53)
HGB BLD-MCNC: 15.2 G/DL (ref 13.3–17.7)
IMM GRANULOCYTES # BLD: 0.1 10E3/UL
IMM GRANULOCYTES NFR BLD: 0 %
LIPASE SERPL-CCNC: 11 U/L (ref 13–60)
LYMPHOCYTES # BLD AUTO: 0.5 10E3/UL (ref 0.8–5.3)
LYMPHOCYTES NFR BLD AUTO: 3 %
MCH RBC QN AUTO: 31 PG (ref 26.5–33)
MCHC RBC AUTO-ENTMCNC: 34.5 G/DL (ref 31.5–36.5)
MCV RBC AUTO: 90 FL (ref 78–100)
MONOCYTES # BLD AUTO: 0.7 10E3/UL (ref 0–1.3)
MONOCYTES NFR BLD AUTO: 4 %
NEUTROPHILS # BLD AUTO: 15.3 10E3/UL (ref 1.6–8.3)
NEUTROPHILS NFR BLD AUTO: 93 %
NRBC # BLD AUTO: 0 10E3/UL
NRBC BLD AUTO-RTO: 0 /100
PLATELET # BLD AUTO: 352 10E3/UL (ref 150–450)
POTASSIUM SERPL-SCNC: 3.4 MMOL/L (ref 3.4–5.3)
PROT SERPL-MCNC: 8.1 G/DL (ref 6.4–8.3)
RBC # BLD AUTO: 4.9 10E6/UL (ref 4.4–5.9)
SODIUM SERPL-SCNC: 139 MMOL/L (ref 135–145)
WBC # BLD AUTO: 16.5 10E3/UL (ref 4–11)

## 2024-08-06 PROCEDURE — 85025 COMPLETE CBC W/AUTO DIFF WBC: CPT | Performed by: EMERGENCY MEDICINE

## 2024-08-06 PROCEDURE — 96361 HYDRATE IV INFUSION ADD-ON: CPT

## 2024-08-06 PROCEDURE — 80053 COMPREHEN METABOLIC PANEL: CPT | Performed by: EMERGENCY MEDICINE

## 2024-08-06 PROCEDURE — 99284 EMERGENCY DEPT VISIT MOD MDM: CPT | Mod: 25

## 2024-08-06 PROCEDURE — 250N000011 HC RX IP 250 OP 636: Performed by: EMERGENCY MEDICINE

## 2024-08-06 PROCEDURE — 36415 COLL VENOUS BLD VENIPUNCTURE: CPT | Performed by: EMERGENCY MEDICINE

## 2024-08-06 PROCEDURE — 96374 THER/PROPH/DIAG INJ IV PUSH: CPT

## 2024-08-06 PROCEDURE — 258N000003 HC RX IP 258 OP 636: Performed by: EMERGENCY MEDICINE

## 2024-08-06 PROCEDURE — 83690 ASSAY OF LIPASE: CPT | Performed by: EMERGENCY MEDICINE

## 2024-08-06 RX ORDER — ONDANSETRON 2 MG/ML
4 INJECTION INTRAMUSCULAR; INTRAVENOUS ONCE
Status: COMPLETED | OUTPATIENT
Start: 2024-08-06 | End: 2024-08-06

## 2024-08-06 RX ADMIN — SODIUM CHLORIDE 1000 ML: 9 INJECTION, SOLUTION INTRAVENOUS at 17:02

## 2024-08-06 RX ADMIN — ONDANSETRON 4 MG: 2 INJECTION INTRAMUSCULAR; INTRAVENOUS at 17:02

## 2024-08-06 ASSESSMENT — ACTIVITIES OF DAILY LIVING (ADL)
ADLS_ACUITY_SCORE: 35

## 2024-08-06 NOTE — ED TRIAGE NOTES
Hx of cyclical vomiting, last THC usage 1x week ago, nausea / vomiting since 4am.      Triage Assessment (Adult)       Row Name 08/06/24 1588          Triage Assessment    Airway WDL WDL        Cognitive/Neuro/Behavioral WDL    Cognitive/Neuro/Behavioral WDL WDL

## 2024-08-06 NOTE — ED PROVIDER NOTES
Emergency Department Note      History of Present Illness   Chief Complaint   Vomiting    HPI   Alex Carvajal is a 24 year old male with a history of alcohol use disorder and THC use disorder who presents for an evaluation of vomiting. The patient stated he has been nauseous and vomiting since 0300 today. He stated having a history of cyclic vomiting and this is similar. He added normally Zofran works but he has been unable to keep it down today. He reported vomiting more than 10 times. He stated having abdominal pain and a slight headache. He denies fever or diarrhea. He also denies being around anyone else sick. He noted having asthma. He noted smoking marijuana. He noted his last drink was 5 days ago and he is usually a heavy drinker when he decides to drink.      Independent Historian   None    Review of External Notes   None   Past Medical History   Medical History and Problem List   Alcohol use disorder  Asthma  Depression  Insomnia  Migraine   Dyslipidemia   THC use disorder  Anxiety     Medications   Bupropion   Guaifenesin   Prazosin   Propranolol   Semaglutide   Wegovy   Tizanidine   Venlafaxine  Aripiprazole   Budesonide   Cyclobenzaprine   Eszopiclone   Famotidine   Gabapentin   Hydroxyzine   Lamotrigine   Meloxicam   Quetiapine   Sertraline    Physical Exam   Patient Vitals for the past 24 hrs:   BP Temp Temp src Pulse Resp SpO2   08/06/24 1544 (!) 173/10 97.5  F (36.4  C) Temporal (!) 132 25 98 %     Physical Exam  General: Alert and cooperative with exam. Patient in mild distress. Normal mentation.  Nontoxic appearance  Head:  Scalp is NC/AT  Eyes:  No scleral icterus, PERRL  ENT:  The external nose and ears are normal. The oropharynx is normal and without erythema; mucus membranes are moist.   Neck:  Normal range of motion without rigidity.  CV:  Mildly tachycardic rate, regular rhythm    No pathologic murmur   Resp:  Breath sounds are clear bilaterally    Non-labored, no retractions or  accessory muscle use  GI:  Abdomen is soft, no distension, no tenderness. No peritoneal signs  MS:  No lower extremity edema   Skin:  Warm and dry, No rash or lesions noted.  Neuro:  Oriented x 3. No gross motor deficits.  Diagnostics   Lab Results   Labs Ordered and Resulted from Time of ED Arrival to Time of ED Departure   COMPREHENSIVE METABOLIC PANEL - Abnormal       Result Value    Sodium 139      Potassium 3.4      Carbon Dioxide (CO2) 24      Anion Gap 15      Urea Nitrogen 14.4      Creatinine 0.69      GFR Estimate >90      Calcium 10.2      Chloride 100      Glucose 148 (*)     Alkaline Phosphatase 103      AST 23      ALT 16      Protein Total 8.1      Albumin 5.0      Bilirubin Total 0.5     LIPASE - Abnormal    Lipase 11 (*)    CBC WITH PLATELETS AND DIFFERENTIAL - Abnormal    WBC Count 16.5 (*)     RBC Count 4.90      Hemoglobin 15.2      Hematocrit 44.0      MCV 90      MCH 31.0      MCHC 34.5      RDW 13.2      Platelet Count 352      % Neutrophils 93      % Lymphocytes 3      % Monocytes 4      % Eosinophils 0      % Basophils 0      % Immature Granulocytes 0      NRBCs per 100 WBC 0      Absolute Neutrophils 15.3 (*)     Absolute Lymphocytes 0.5 (*)     Absolute Monocytes 0.7      Absolute Eosinophils 0.0      Absolute Basophils 0.0      Absolute Immature Granulocytes 0.1      Absolute NRBCs 0.0       Imaging   None     EKG   None    Independent Interpretation   None  ED Course    Medications Administered   Medications   sodium chloride 0.9% BOLUS 1,000 mL (1,000 mLs Intravenous $New Bag 8/6/24 1702)   ondansetron (ZOFRAN) injection 4 mg (4 mg Intravenous $Given 8/6/24 1702)     Procedures   None      Discussion of Management   None    ED Course   ED Course as of 08/06/24 2320 Tue Aug 06, 2024   1657 I obtained history and examined the patient as noted above.    1835 I consulted with the patient.      Additional Documentation  None  Medical Decision Making / Diagnosis   CMS Diagnoses:  None    MIPS       None    [facility]   Alex Carvajal is a 24 year old male who presents with acute nausea and vomiting. He has a benign abdominal exam.  No indication for advanced abdominal imaging at this time.  Symptoms are improved after IV fluids and symptomatic treatment.  Labs notable for elevated white count of (16.5; likely stress/demargination reaction from repeated episodes of vomiting).  Patient denies urinary symptoms.  He has had similar episodes of cyclic vomiting in the past and does endorse marijuana usage within the last week; recommended cessation of marijuana use.  He also reports history of alcohol abuse but denies recent alcohol consumption or concern for withdrawal at this time.  Patient later eloped from the emergency department.  I did call him on the phone to discuss his lab results with him.  Also discussed return precautions as well as recommendation for close follow-up with PCP if symptoms continue.  Can continue previously prescribed Zofran as needed.    Disposition   The patient eloped.     Diagnosis     ICD-10-CM    1. Cyclical vomiting  R11.15            Discharge Medications   Discharge Medication List as of 8/6/2024  6:29 PM        Scribe Disclosure:  I, Miya Cordova, am serving as a scribe at 6:37 PM on 8/6/2024 to document services personally performed by Javi Fox DO, based on my observations and the provider's statements to me.      Javi Fox DO  08/06/24 0566

## 2024-08-07 ENCOUNTER — HOSPITAL ENCOUNTER (EMERGENCY)
Facility: CLINIC | Age: 24
Discharge: LEFT BEFORE DISCHARGE | End: 2024-08-07
Admitting: EMERGENCY MEDICINE
Payer: COMMERCIAL

## 2024-08-07 VITALS
HEIGHT: 67 IN | BODY MASS INDEX: 30.61 KG/M2 | OXYGEN SATURATION: 97 % | WEIGHT: 195 LBS | RESPIRATION RATE: 20 BRPM | SYSTOLIC BLOOD PRESSURE: 131 MMHG | DIASTOLIC BLOOD PRESSURE: 87 MMHG | TEMPERATURE: 98.5 F | HEART RATE: 115 BPM

## 2024-08-07 PROCEDURE — 250N000013 HC RX MED GY IP 250 OP 250 PS 637: Performed by: EMERGENCY MEDICINE

## 2024-08-07 PROCEDURE — 250N000011 HC RX IP 250 OP 636: Performed by: STUDENT IN AN ORGANIZED HEALTH CARE EDUCATION/TRAINING PROGRAM

## 2024-08-07 PROCEDURE — 99281 EMR DPT VST MAYX REQ PHY/QHP: CPT

## 2024-08-07 RX ORDER — OLANZAPINE 5 MG/1
5 TABLET, ORALLY DISINTEGRATING ORAL ONCE
Status: COMPLETED | OUTPATIENT
Start: 2024-08-07 | End: 2024-08-07

## 2024-08-07 RX ORDER — OLANZAPINE 5 MG/1
5 TABLET, ORALLY DISINTEGRATING ORAL AT BEDTIME
Status: DISCONTINUED | OUTPATIENT
Start: 2024-08-07 | End: 2024-08-07

## 2024-08-07 RX ORDER — ONDANSETRON 4 MG/1
4 TABLET, ORALLY DISINTEGRATING ORAL ONCE
Status: COMPLETED | OUTPATIENT
Start: 2024-08-07 | End: 2024-08-07

## 2024-08-07 RX ADMIN — ONDANSETRON 4 MG: 4 TABLET, ORALLY DISINTEGRATING ORAL at 10:11

## 2024-08-07 RX ADMIN — OLANZAPINE 5 MG: 5 TABLET, ORALLY DISINTEGRATING ORAL at 10:11

## 2024-08-07 ASSESSMENT — ACTIVITIES OF DAILY LIVING (ADL)
ADLS_ACUITY_SCORE: 33

## 2024-08-07 ASSESSMENT — COLUMBIA-SUICIDE SEVERITY RATING SCALE - C-SSRS
1. IN THE PAST MONTH, HAVE YOU WISHED YOU WERE DEAD OR WISHED YOU COULD GO TO SLEEP AND NOT WAKE UP?: NO
6. HAVE YOU EVER DONE ANYTHING, STARTED TO DO ANYTHING, OR PREPARED TO DO ANYTHING TO END YOUR LIFE?: NO
2. HAVE YOU ACTUALLY HAD ANY THOUGHTS OF KILLING YOURSELF IN THE PAST MONTH?: NO

## 2024-08-07 NOTE — ED TRIAGE NOTES
Presented to [facility] ED yesterday for cyclical vomiting, seen and evaluated, discharged, lab work completed yesterday, IVF and zofran given      Triage Assessment (Adult)       Row Name 08/07/24 0824          Triage Assessment    Airway WDL WDL        Cognitive/Neuro/Behavioral WDL    Cognitive/Neuro/Behavioral WDL WDL

## 2024-08-07 NOTE — ED TRIAGE NOTES
Patient presents with complaints of cyclical vomiting since 0300 yesterday morning. Patient was in the ED yesterday and left without being seen.      Triage Assessment (Adult)       Row Name 08/07/24 1001 08/07/24 0824       Triage Assessment    Airway WDL WDL WDL       Respiratory WDL    Respiratory WDL WDL --       Skin Circulation/Temperature WDL    Skin Circulation/Temperature WDL WDL --       Cardiac WDL    Cardiac WDL WDL --       Peripheral/Neurovascular WDL    Peripheral Neurovascular WDL WDL --       Cognitive/Neuro/Behavioral WDL    Cognitive/Neuro/Behavioral WDL WDL WDL                     Detail Level: Generalized Detail Level: Zone Detail Level: Simple Detail Level: Detailed

## 2024-08-08 ENCOUNTER — APPOINTMENT (OUTPATIENT)
Dept: CT IMAGING | Facility: CLINIC | Age: 24
End: 2024-08-08
Attending: EMERGENCY MEDICINE
Payer: COMMERCIAL

## 2024-08-08 ENCOUNTER — HOSPITAL ENCOUNTER (EMERGENCY)
Facility: CLINIC | Age: 24
Discharge: HOME | End: 2024-08-08
Attending: EMERGENCY MEDICINE | Admitting: EMERGENCY MEDICINE
Payer: COMMERCIAL

## 2024-08-08 VITALS
TEMPERATURE: 98.3 F | HEART RATE: 97 BPM | BODY MASS INDEX: 30.61 KG/M2 | DIASTOLIC BLOOD PRESSURE: 103 MMHG | WEIGHT: 195 LBS | SYSTOLIC BLOOD PRESSURE: 158 MMHG | RESPIRATION RATE: 16 BRPM | HEIGHT: 67 IN | OXYGEN SATURATION: 98 %

## 2024-08-08 DIAGNOSIS — E87.6 LOW BLOOD POTASSIUM: ICD-10-CM

## 2024-08-08 DIAGNOSIS — R10.9 ABDOMINAL PAIN, UNSPECIFIED ABDOMINAL LOCATION: ICD-10-CM

## 2024-08-08 DIAGNOSIS — R11.15 CYCLICAL VOMITING: ICD-10-CM

## 2024-08-08 LAB
ALBUMIN SERPL BCG-MCNC: 4.7 G/DL (ref 3.5–5.2)
ALP SERPL-CCNC: 87 U/L (ref 40–150)
ALT SERPL W P-5'-P-CCNC: 17 U/L (ref 0–70)
ANION GAP SERPL CALCULATED.3IONS-SCNC: 11 MMOL/L (ref 7–15)
AST SERPL W P-5'-P-CCNC: 23 U/L (ref 0–45)
BASOPHILS # BLD AUTO: 0 10E3/UL (ref 0–0.2)
BASOPHILS NFR BLD AUTO: 0 %
BILIRUB SERPL-MCNC: 0.9 MG/DL
BUN SERPL-MCNC: 10.2 MG/DL (ref 6–20)
CALCIUM SERPL-MCNC: 10.2 MG/DL (ref 8.8–10.4)
CHLORIDE SERPL-SCNC: 94 MMOL/L (ref 98–107)
CREAT SERPL-MCNC: 0.72 MG/DL (ref 0.67–1.17)
EGFRCR SERPLBLD CKD-EPI 2021: >90 ML/MIN/1.73M2
EOSINOPHIL # BLD AUTO: 0 10E3/UL (ref 0–0.7)
EOSINOPHIL NFR BLD AUTO: 0 %
ERYTHROCYTE [DISTWIDTH] IN BLOOD BY AUTOMATED COUNT: 13.3 % (ref 10–15)
GLUCOSE SERPL-MCNC: 103 MG/DL (ref 70–99)
HCO3 SERPL-SCNC: 32 MMOL/L (ref 22–29)
HCT VFR BLD AUTO: 41.3 % (ref 40–53)
HGB BLD-MCNC: 14 G/DL (ref 13.3–17.7)
IMM GRANULOCYTES # BLD: 0 10E3/UL
IMM GRANULOCYTES NFR BLD: 0 %
LIPASE SERPL-CCNC: 23 U/L (ref 13–60)
LYMPHOCYTES # BLD AUTO: 1.2 10E3/UL (ref 0.8–5.3)
LYMPHOCYTES NFR BLD AUTO: 11 %
MCH RBC QN AUTO: 30.7 PG (ref 26.5–33)
MCHC RBC AUTO-ENTMCNC: 33.9 G/DL (ref 31.5–36.5)
MCV RBC AUTO: 91 FL (ref 78–100)
MONOCYTES # BLD AUTO: 1.1 10E3/UL (ref 0–1.3)
MONOCYTES NFR BLD AUTO: 10 %
NEUTROPHILS # BLD AUTO: 8.8 10E3/UL (ref 1.6–8.3)
NEUTROPHILS NFR BLD AUTO: 79 %
NRBC # BLD AUTO: 0 10E3/UL
NRBC BLD AUTO-RTO: 0 /100
PLATELET # BLD AUTO: 315 10E3/UL (ref 150–450)
POTASSIUM SERPL-SCNC: 2.8 MMOL/L (ref 3.4–5.3)
PROT SERPL-MCNC: 7.7 G/DL (ref 6.4–8.3)
RBC # BLD AUTO: 4.56 10E6/UL (ref 4.4–5.9)
SODIUM SERPL-SCNC: 137 MMOL/L (ref 135–145)
WBC # BLD AUTO: 11.2 10E3/UL (ref 4–11)

## 2024-08-08 PROCEDURE — 96368 THER/DIAG CONCURRENT INF: CPT

## 2024-08-08 PROCEDURE — 99285 EMERGENCY DEPT VISIT HI MDM: CPT | Mod: 25

## 2024-08-08 PROCEDURE — 250N000011 HC RX IP 250 OP 636: Performed by: EMERGENCY MEDICINE

## 2024-08-08 PROCEDURE — 36415 COLL VENOUS BLD VENIPUNCTURE: CPT | Performed by: EMERGENCY MEDICINE

## 2024-08-08 PROCEDURE — 96361 HYDRATE IV INFUSION ADD-ON: CPT

## 2024-08-08 PROCEDURE — 82374 ASSAY BLOOD CARBON DIOXIDE: CPT | Performed by: EMERGENCY MEDICINE

## 2024-08-08 PROCEDURE — 74177 CT ABD & PELVIS W/CONTRAST: CPT

## 2024-08-08 PROCEDURE — 258N000003 HC RX IP 258 OP 636: Performed by: EMERGENCY MEDICINE

## 2024-08-08 PROCEDURE — 85025 COMPLETE CBC W/AUTO DIFF WBC: CPT | Performed by: EMERGENCY MEDICINE

## 2024-08-08 PROCEDURE — 96365 THER/PROPH/DIAG IV INF INIT: CPT | Mod: 59

## 2024-08-08 PROCEDURE — 250N000009 HC RX 250: Performed by: EMERGENCY MEDICINE

## 2024-08-08 PROCEDURE — 83690 ASSAY OF LIPASE: CPT | Performed by: EMERGENCY MEDICINE

## 2024-08-08 PROCEDURE — 96376 TX/PRO/DX INJ SAME DRUG ADON: CPT

## 2024-08-08 PROCEDURE — 250N000013 HC RX MED GY IP 250 OP 250 PS 637: Performed by: EMERGENCY MEDICINE

## 2024-08-08 RX ORDER — METOCLOPRAMIDE 10 MG/1
10 TABLET ORAL 3 TIMES DAILY PRN
Qty: 20 TABLET | Refills: 0 | Status: SHIPPED | OUTPATIENT
Start: 2024-08-08

## 2024-08-08 RX ORDER — IOPAMIDOL 755 MG/ML
98 INJECTION, SOLUTION INTRAVASCULAR ONCE
Status: COMPLETED | OUTPATIENT
Start: 2024-08-08 | End: 2024-08-08

## 2024-08-08 RX ORDER — MAGNESIUM SULFATE HEPTAHYDRATE 40 MG/ML
2 INJECTION, SOLUTION INTRAVENOUS ONCE
Status: COMPLETED | OUTPATIENT
Start: 2024-08-08 | End: 2024-08-08

## 2024-08-08 RX ORDER — DIPHENHYDRAMINE HYDROCHLORIDE 50 MG/ML
25 INJECTION INTRAMUSCULAR; INTRAVENOUS ONCE
Status: COMPLETED | OUTPATIENT
Start: 2024-08-08 | End: 2024-08-08

## 2024-08-08 RX ORDER — METOCLOPRAMIDE HYDROCHLORIDE 5 MG/ML
10 INJECTION INTRAMUSCULAR; INTRAVENOUS ONCE
Status: COMPLETED | OUTPATIENT
Start: 2024-08-08 | End: 2024-08-08

## 2024-08-08 RX ORDER — ONDANSETRON 4 MG/1
4 TABLET, ORALLY DISINTEGRATING ORAL EVERY 8 HOURS PRN
Qty: 10 TABLET | Refills: 0 | Status: SHIPPED | OUTPATIENT
Start: 2024-08-08 | End: 2024-08-11

## 2024-08-08 RX ORDER — ONDANSETRON 2 MG/ML
8 INJECTION INTRAMUSCULAR; INTRAVENOUS ONCE
Status: COMPLETED | OUTPATIENT
Start: 2024-08-08 | End: 2024-08-08

## 2024-08-08 RX ORDER — POTASSIUM CHLORIDE 7.45 MG/ML
10 INJECTION INTRAVENOUS ONCE
Status: COMPLETED | OUTPATIENT
Start: 2024-08-08 | End: 2024-08-08

## 2024-08-08 RX ORDER — POTASSIUM CHLORIDE 1.5 G/1.58G
60 POWDER, FOR SOLUTION ORAL ONCE
Status: COMPLETED | OUTPATIENT
Start: 2024-08-08 | End: 2024-08-08

## 2024-08-08 RX ADMIN — ONDANSETRON 8 MG: 2 INJECTION INTRAMUSCULAR; INTRAVENOUS at 09:12

## 2024-08-08 RX ADMIN — POTASSIUM CHLORIDE 60 MEQ: 1.5 POWDER, FOR SOLUTION ORAL at 10:46

## 2024-08-08 RX ADMIN — METOCLOPRAMIDE 10 MG: 5 INJECTION, SOLUTION INTRAMUSCULAR; INTRAVENOUS at 09:12

## 2024-08-08 RX ADMIN — SODIUM CHLORIDE 68 ML: 9 INJECTION, SOLUTION INTRAVENOUS at 10:15

## 2024-08-08 RX ADMIN — DIPHENHYDRAMINE HYDROCHLORIDE 25 MG: 50 INJECTION INTRAMUSCULAR; INTRAVENOUS at 09:12

## 2024-08-08 RX ADMIN — MAGNESIUM SULFATE HEPTAHYDRATE 2 G: 40 INJECTION, SOLUTION INTRAVENOUS at 10:38

## 2024-08-08 RX ADMIN — SODIUM CHLORIDE 1000 ML: 9 INJECTION, SOLUTION INTRAVENOUS at 09:12

## 2024-08-08 RX ADMIN — POTASSIUM CHLORIDE 10 MEQ: 7.46 INJECTION, SOLUTION INTRAVENOUS at 10:40

## 2024-08-08 RX ADMIN — IOPAMIDOL 98 ML: 755 INJECTION, SOLUTION INTRAVENOUS at 10:14

## 2024-08-08 ASSESSMENT — ACTIVITIES OF DAILY LIVING (ADL)
ADLS_ACUITY_SCORE: 35

## 2024-08-08 NOTE — ED TRIAGE NOTES
21/2 days nausea and abd pain, seen this morning at  sent here, also seen yesterday at   and given zofran which helped

## 2024-08-08 NOTE — DISCHARGE INSTRUCTIONS
As we discussed, your CT scan is unremarkable and I do suspect that most of your pain is related to the cyclic vomiting syndrome.  Please continue to avoid any THC or alcohol until you are cleared by your regular doctor in the next week.  We have also given you several prescriptions that should help with your symptoms as well.  Come back to the ER immediately with any other concerns or worsening symptoms that you have.  You will need to get your potassium rechecked this week as well.

## 2024-08-08 NOTE — ED PROVIDER NOTES
Emergency Department Note      History of Present Illness     Chief Complaint  Abdominal Pain and Vomiting    HPI  Alex Carvajal is a 24 year old male who presents to the emergency room with what appears to be abdominal pain has been present for at least a week, but got worse yesterday and is having persistent vomiting.  He states that he went to urgent care today and his abdomen was tender and he was sent here to the ER for a workup and continued diagnostics.  Patient states that he has a history of cannabis hyperemesis syndrome and did not use any marijuana of any kind for 100 days and had no symptoms.  Then last week he began using again for several days, and states that he has had a return of identical symptoms and notably he has not had any other THC or alcohol apart from those 2 to 3 days, in the last week.        Independent Historian  No    Review of External Notes  Yes I have reviewed the patient's last office visit note with his primary care doctor on 31 July of this year where he was seen for back issues.      Past Medical History   Medical History and Problem List  No past medical history on file.    Medications  albuterol (PROVENTIL) (2.5 MG/3ML) 0.083% neb solution  amitriptyline (ELAVIL) 25 MG tablet  ARIPiprazole (ABILIFY) 5 MG tablet  Artificial Saliva (MOUTH KOTE) SOLN  baclofen (LIORESAL) 10 MG tablet  budesonide (PULMICORT FLEXHALER) 180 MCG/ACT inhaler  capsaicin (ZOSTRIX) 0.025 % external cream  CHEST CONGESTION RELIEF DM  MG/5ML syrup  cloNIDine (CATAPRES) 0.1 MG tablet  cyclobenzaprine (FLEXERIL) 10 MG tablet  dextran 70-hypromellose (TEARS NATURALE FREE PF) 0.1-0.3 % ophthalmic solution  diclofenac (VOLTAREN) 1 % topical gel  eszopiclone (LUNESTA) 1 MG tablet  famotidine (PEPCID) 20 MG tablet  fluorouracil (EFUDEX) 5 % external cream  fluticasone (FLONASE) 50 MCG/ACT nasal spray  gabapentin (NEURONTIN) 600 MG tablet  hydrOXYzine HCl (ATARAX) 25 MG tablet  lamoTRIgine  "(LAMICTAL) 150 MG tablet  lidocaine (XYLOCAINE) 5 % external ointment  melatonin 3 MG tablet  meloxicam (MOBIC) 7.5 MG tablet  mirtazapine (REMERON) 15 MG tablet  montelukast (SINGULAIR) 10 MG tablet  mupirocin (BACTROBAN) 2 % external ointment  ondansetron (ZOFRAN ODT) 4 MG ODT tab  polyethylene glycol (MIRALAX) 17 GM/Dose powder  predniSONE (DELTASONE) 10 MG tablet  QUEtiapine (SEROQUEL) 50 MG tablet  QUEtiapine ER (SEROQUEL XR) 200 MG 24 hr tablet  salicylic acid 17 % LIQD  sertraline (ZOLOFT) 100 MG tablet  SUMAtriptan (IMITREX) 50 MG tablet  VENTOLIN  (90 Base) MCG/ACT inhaler        Surgical History   No past surgical history on file.      Physical Exam   Patient Vitals for the past 24 hrs:   BP Temp Pulse Resp SpO2 Height Weight   08/08/24 0855 -- 98.3  F (36.8  C) -- 16 -- 1.702 m (5' 7\") 88.5 kg (195 lb)   08/08/24 0854 -- -- -- -- 94 % -- --   08/08/24 0853 (!) 143/92 -- 103 -- -- -- --       Physical Exam  Vitals: reviewed by me  General: Pt seen on [facility], cooperative, and alert to conversation  Eyes: Tracking well, clear conjunctiva BL  ENT: MMM, midline trachea.   Lungs: No tachypnea, no accessory muscle use. No respiratory distress.   CV: Rate as above  Abd: Soft, does however have periumbilical, epigastric and left lower quadrant tenderness to palpation.  Left lower quadrant does have mild guarding, no rebound.  MSK: no joint effusion.  No evidence of trauma  Skin: No rash  Neuro: Clear speech and no facial droop.  Psych: Not RIS, no e/o AH/      Diagnostics   Lab Results   Labs Ordered and Resulted from Time of ED Arrival to Time of ED Departure   COMPREHENSIVE METABOLIC PANEL - Abnormal       Result Value    Sodium 137      Potassium 2.8 (*)     Carbon Dioxide (CO2) 32 (*)     Anion Gap 11      Urea Nitrogen 10.2      Creatinine 0.72      GFR Estimate >90      Calcium 10.2      Chloride 94 (*)     Glucose 103 (*)     Alkaline Phosphatase 87      AST 23      ALT 17      " Protein Total 7.7      Albumin 4.7      Bilirubin Total 0.9     CBC WITH PLATELETS AND DIFFERENTIAL - Abnormal    WBC Count 11.2 (*)     RBC Count 4.56      Hemoglobin 14.0      Hematocrit 41.3      MCV 91      MCH 30.7      MCHC 33.9      RDW 13.3      Platelet Count 315      % Neutrophils 79      % Lymphocytes 11      % Monocytes 10      % Eosinophils 0      % Basophils 0      % Immature Granulocytes 0      NRBCs per 100 WBC 0      Absolute Neutrophils 8.8 (*)     Absolute Lymphocytes 1.2      Absolute Monocytes 1.1      Absolute Eosinophils 0.0      Absolute Basophils 0.0      Absolute Immature Granulocytes 0.0      Absolute NRBCs 0.0     LIPASE - Normal    Lipase 23         Imaging  CT Abdomen Pelvis w Contrast   Preliminary Result   IMPRESSION:    1.  No acute abnormality identified.                    ED Course      Medications Administered   Medications   sodium chloride 0.9% BOLUS 1,000 mL (0 mLs Intravenous Stopped 8/8/24 1047)   ondansetron (ZOFRAN) injection 8 mg (8 mg Intravenous $Given 8/8/24 0912)   metoclopramide (REGLAN) injection 10 mg (10 mg Intravenous $Given 8/8/24 0912)   diphenhydrAMINE (BENADRYL) injection 25 mg (25 mg Intravenous $Given 8/8/24 0912)   Saline Flush (68 mLs Intravenous $Given 8/8/24 1015)   iopamidol (ISOVUE-370) solution 98 mL (98 mLs Intravenous $Given 8/8/24 1014)   magnesium sulfate 2 g in 50 mL sterile water intermittent infusion (0 g Intravenous Stopped 8/8/24 1135)   potassium chloride (KLOR-CON) Packet 60 mEq (60 mEq Oral $Given 8/8/24 1046)   potassium chloride 10 mEq in 100 mL sterile water infusion (0 mEq Intravenous Stopped 8/8/24 1135)            Optional/Additional Documentation  Stress/Adjustment Disorders      Medical Decision Making / Diagnosis         MDM  This is a very pleasant 24-year-old male who presents to the emergency room with what appears to be cyclic vomiting syndrome and abdominal pain.  His abdomen was tender and he stated more than it was  previously, and for this reason I did do a CT scan and thankfully there is no evidence of an emergent cause.  He is tolerating orals here, I do think that we will recommend that he continues to not use any THC or alcohol products, I will also send him home with several antiemetics that should be helpful as well.  I have asked him to follow-up with his regular doctor in the next 1 week as well, patient tells me he feels comfortable with this plan.      ICD-10 Codes:    ICD-10-CM    1. Cyclical vomiting  R11.15       2. Abdominal pain, unspecified abdominal location  R10.9       3. Low blood potassium  E87.6              Discharge Medications  Discharge Medication List as of 8/8/2024 12:08 PM        START taking these medications    Details   metoclopramide (REGLAN) 10 MG tablet Take 1 tablet (10 mg) by mouth 3 times daily as needed (Nausea or Vomiting), Disp-20 tablet, R-0, Local Print      !! ondansetron (ZOFRAN ODT) 4 MG ODT tab Take 1 tablet (4 mg) by mouth every 8 hours as needed, Disp-10 tablet, R-0, Local Print       !! - Potential duplicate medications found. Please discuss with provider.                     Keith Wilkes MD  08/08/24 5564

## 2024-08-09 NOTE — ED PROVIDER NOTES
Patient called the emergency department seeking assistance with a medication issue.  I opened his chart and reviewed his visit from yesterday briefly.  Patient reports that [facility] would not fill the metoclopramide because he is also on quetiapine.  Patient states that they will fill the Zofran however.  I declined to make any adjustments to his medication regimen over the phone, given that I do not know his situation well, and do not have the ability for longitudinal care.  I specifically advised him to seek input from his clinic team on this matter.  He expressed understanding.    MD Albertina Randhawa Jeffrey Alan, MD  08/09/24 8147

## 2024-12-28 ENCOUNTER — HEALTH MAINTENANCE LETTER (OUTPATIENT)
Age: 24
End: 2024-12-28

## 2025-02-11 ENCOUNTER — MEDICAL CORRESPONDENCE (OUTPATIENT)
Dept: HEALTH INFORMATION MANAGEMENT | Facility: CLINIC | Age: 25
End: 2025-02-11
Payer: COMMERCIAL

## 2025-03-02 ENCOUNTER — HOSPITAL ENCOUNTER (EMERGENCY)
Facility: CLINIC | Age: 25
Discharge: HOME | End: 2025-03-02
Attending: EMERGENCY MEDICINE | Admitting: EMERGENCY MEDICINE
Payer: COMMERCIAL

## 2025-03-02 VITALS
HEART RATE: 103 BPM | SYSTOLIC BLOOD PRESSURE: 145 MMHG | OXYGEN SATURATION: 97 % | TEMPERATURE: 97.7 F | RESPIRATION RATE: 18 BRPM | DIASTOLIC BLOOD PRESSURE: 97 MMHG

## 2025-03-02 DIAGNOSIS — M25.532 CHRONIC PAIN OF LEFT WRIST: ICD-10-CM

## 2025-03-02 DIAGNOSIS — G89.29 CHRONIC PAIN OF LEFT WRIST: ICD-10-CM

## 2025-03-02 PROCEDURE — 99283 EMERGENCY DEPT VISIT LOW MDM: CPT

## 2025-03-02 RX ORDER — PREDNISONE 20 MG/1
TABLET ORAL
Qty: 6 TABLET | Refills: 0 | Status: SHIPPED | OUTPATIENT
Start: 2025-03-02

## 2025-03-02 ASSESSMENT — ACTIVITIES OF DAILY LIVING (ADL): ADLS_ACUITY_SCORE: 41

## 2025-03-02 NOTE — ED TRIAGE NOTES
Patient here  with left wrist pain. He stated he has chronic pain but the last 2 days  has been worse     Triage Assessment (Adult)       Row Name 03/02/25 0049          Triage Assessment    Airway WDL WDL        Respiratory WDL    Respiratory WDL WDL        Skin Circulation/Temperature WDL    Skin Circulation/Temperature WDL WDL        Cardiac WDL    Cardiac WDL WDL        Peripheral/Neurovascular WDL    Peripheral Neurovascular WDL WDL        Cognitive/Neuro/Behavioral WDL    Cognitive/Neuro/Behavioral WDL WDL

## 2025-03-02 NOTE — DISCHARGE INSTRUCTIONS
Continue your medications as prescribed  Call the clinic to see if you can get another wrist injection  Try few days of steroids to see if this can help

## 2025-03-02 NOTE — ED PROVIDER NOTES
Emergency Department Note      History of Present Illness     Chief Complaint   Wrist Pain    HPI   Alex Carvajal is a 24 year old male with history of chronic pain disorder who presents to the ED for evaluation of left wrist pain. Tien reports that he has been getting steroid injections, however, they have not been pleasant or been as helpful as he'd like them to be. He has an appointment with the pain clinic on 03/21. Tien adds that he has been having flare ups that cause a tense sensation which extends up his left arm, during flares Tien wears a wrist brace. Tien notes that the tense sensation is specifically in his ring and pinky fingers. He denies any triggers for this currently flare. He denies swelling, numbness, or tingling.     Independent Historian   None    Review of External Notes   I reviewed the office visit from 01/22/2025, he was seen for chronic wrist pain, and  was prescribed gabapentin and Tylenol #2.   The patient had a steroid injection to the left wrist on 01/02/2025 at [facility] [place], [place].    Past Medical History     Medical History and Problem List   Alcohol use disorder   Asthma   Anxiety  Chronic pain disorder   Dyslipidemia  Depression   Insomnia   Migraine    Neuropathic pain   Obesity   Panic disorder with agoraphobia   Seasonal allergic rhinitis   THC use disorder   Vitamin D deficiency     Medications   Adderall   Atarax   Buspar   Commit   Catapres   Campral   Desyrel   Effexor   Inderal   Glucophage   Imitrex   Lunesta   Lamictal   Neurontin   Prednisone   Reglan   Symbicort   Seroquel   Wellbutrin   Wegovy   Zofran   Zoloft       Physical Exam     Patient Vitals for the past 24 hrs:   BP Temp Temp src Pulse Resp SpO2   03/02/25 0049 (!) 145/97 97.7  F (36.5  C) Temporal 103 18 97 %     Physical Exam  General: Sitting up in bed  Eyes:  The pupils are equal and round    Conjunctivae and sclerae are normal  ENT:    Atraumatic face  Neck:  Normal range of motion  CV:  Tachycardic  rate    Radial pulse 2+ on left    Skin warm and well perfused   Resp:  Non labored breathing on room air    No tachypnea    No cough heard  MS:  Minimal tenderness on left lateral wrist with no swelling or erythema. Full ROM of left hand fingers and wrist  Skin:  No rash or acute skin lesions noted on hand/wrist  Neuro:   Awake, alert.      Speech is normal and fluent.    Face is symmetric.     No weakness on left hand finger/wrist    SILT on left hand  Psych: Normal affect.  Appropriate interactions.    ED Course      Procedures   Procedures     Discussion of Management   None    ED Course   ED Course as of 03/02/25 0130   Sun Mar 02, 2025   0106 I obtained history and examined the patient as noted above.      Additional Documentation  None    Medical Decision Making / Diagnosis     [facility]   Alex Carvajal is a 24 year old male presented to the emergency department with wrist pain.  This is chronic pain.  He has a pain contract.  He is requesting more Tylenol No. 2's but I discussed that this would be against his pain contract and then his clinic would likely not prescribe his current medications for him.  He is doing several topical medications as well as taking gabapentin, anti-inflammatory already.  He may be due for steroid injections fairly soon so he will follow-up in clinic regarding this.  He reports he has had improvement with oral steroids in the past and so would like a prescription for a few days of this.  I do think this is low risk overall so few days of prednisone prescribed for him.  There is no trauma and x-ray not indicated.  There is no evidence of infection or septic arthritis.  No neurologic changes.  No additional workup is indicated in the emergency department today.  Follow-up as an outpatient.    Disposition   The patient was discharged.     Diagnosis     ICD-10-CM    1. Chronic pain of left wrist  M25.532     G89.29          Discharge Medications   New Prescriptions    PREDNISONE  (DELTASONE) 20 MG TABLET    Take two tablets (= 40mg) each day for 3 (three) days     I, Paradise Fitzgerald, am serving as a scribe at 12:54 AM on 3/2/2025 to document services personally performed by Lorraine Linares MD based on my observations and the provider's statements to me.        Lorraine Linares MD  03/02/25 0522

## 2025-05-05 DIAGNOSIS — Z01.10 ENCOUNTER FOR HEARING EXAMINATION: Primary | ICD-10-CM

## 2025-05-06 ENCOUNTER — PATIENT OUTREACH (OUTPATIENT)
Dept: CARE COORDINATION | Facility: CLINIC | Age: 25
End: 2025-05-06
Payer: COMMERCIAL

## 2025-05-13 ENCOUNTER — PATIENT OUTREACH (OUTPATIENT)
Dept: CARE COORDINATION | Facility: CLINIC | Age: 25
End: 2025-05-13
Payer: COMMERCIAL

## 2025-08-18 ENCOUNTER — HOSPITAL ENCOUNTER (EMERGENCY)
Facility: CLINIC | Age: 25
Discharge: HOME | End: 2025-08-18
Attending: EMERGENCY MEDICINE | Admitting: EMERGENCY MEDICINE
Payer: COMMERCIAL

## 2025-08-18 VITALS
HEIGHT: 67 IN | WEIGHT: 193 LBS | DIASTOLIC BLOOD PRESSURE: 93 MMHG | OXYGEN SATURATION: 99 % | TEMPERATURE: 98.4 F | HEART RATE: 99 BPM | RESPIRATION RATE: 18 BRPM | SYSTOLIC BLOOD PRESSURE: 146 MMHG | BODY MASS INDEX: 30.29 KG/M2

## 2025-08-18 DIAGNOSIS — R68.89 FEELING UNWELL: Primary | ICD-10-CM

## 2025-08-18 LAB
ALBUMIN SERPL BCG-MCNC: 4.2 G/DL (ref 3.5–5.2)
ALP SERPL-CCNC: 88 U/L (ref 40–150)
ALT SERPL W P-5'-P-CCNC: 22 U/L (ref 0–70)
ANION GAP SERPL CALCULATED.3IONS-SCNC: 11 MMOL/L (ref 7–15)
AST SERPL W P-5'-P-CCNC: 23 U/L (ref 0–45)
BASOPHILS # BLD AUTO: 0.07 10E3/UL (ref 0–0.2)
BASOPHILS NFR BLD AUTO: 1.1 %
BILIRUB SERPL-MCNC: 0.3 MG/DL
BUN SERPL-MCNC: 15.7 MG/DL (ref 6–20)
CALCIUM SERPL-MCNC: 8.7 MG/DL (ref 8.8–10.4)
CHLORIDE SERPL-SCNC: 100 MMOL/L (ref 98–107)
CREAT SERPL-MCNC: 0.92 MG/DL (ref 0.67–1.17)
EGFRCR SERPLBLD CKD-EPI 2021: >90 ML/MIN/1.73M2
EOSINOPHIL # BLD AUTO: 0.2 10E3/UL (ref 0–0.7)
EOSINOPHIL NFR BLD AUTO: 3.1 %
ERYTHROCYTE [DISTWIDTH] IN BLOOD BY AUTOMATED COUNT: 12.4 % (ref 10–15)
GLUCOSE SERPL-MCNC: 106 MG/DL (ref 70–99)
HCO3 SERPL-SCNC: 25 MMOL/L (ref 22–29)
HCT VFR BLD AUTO: 40.1 % (ref 40–53)
HGB BLD-MCNC: 13.7 G/DL (ref 13.3–17.7)
IMM GRANULOCYTES # BLD: <0.03 10E3/UL
IMM GRANULOCYTES NFR BLD: 0.2 %
LYMPHOCYTES # BLD AUTO: 1.93 10E3/UL (ref 0.8–5.3)
LYMPHOCYTES NFR BLD AUTO: 29.6 %
MCH RBC QN AUTO: 30.3 PG (ref 26.5–33)
MCHC RBC AUTO-ENTMCNC: 34.2 G/DL (ref 31.5–36.5)
MCV RBC AUTO: 88.7 FL (ref 78–100)
MONOCYTES # BLD AUTO: 0.86 10E3/UL (ref 0–1.3)
MONOCYTES NFR BLD AUTO: 13.2 %
NEUTROPHILS # BLD AUTO: 3.45 10E3/UL (ref 1.6–8.3)
NEUTROPHILS NFR BLD AUTO: 52.8 %
NRBC # BLD AUTO: <0.03 10E3/UL
NRBC BLD AUTO-RTO: 0 /100
PLATELET # BLD AUTO: 263 10E3/UL (ref 150–450)
POTASSIUM SERPL-SCNC: 4.2 MMOL/L (ref 3.4–5.3)
PROT SERPL-MCNC: 6.4 G/DL (ref 6.4–8.3)
RBC # BLD AUTO: 4.52 10E6/UL (ref 4.4–5.9)
SODIUM SERPL-SCNC: 136 MMOL/L (ref 135–145)
WBC # BLD AUTO: 6.52 10E3/UL (ref 4–11)

## 2025-08-18 PROCEDURE — 99285 EMERGENCY DEPT VISIT HI MDM: CPT | Performed by: EMERGENCY MEDICINE

## 2025-08-18 PROCEDURE — 99283 EMERGENCY DEPT VISIT LOW MDM: CPT | Performed by: EMERGENCY MEDICINE

## 2025-08-18 PROCEDURE — 36415 COLL VENOUS BLD VENIPUNCTURE: CPT | Performed by: EMERGENCY MEDICINE

## 2025-08-18 PROCEDURE — 84155 ASSAY OF PROTEIN SERUM: CPT | Performed by: EMERGENCY MEDICINE

## 2025-08-18 PROCEDURE — 85004 AUTOMATED DIFF WBC COUNT: CPT | Performed by: EMERGENCY MEDICINE

## 2025-08-18 ASSESSMENT — ACTIVITIES OF DAILY LIVING (ADL)
ADLS_ACUITY_SCORE: 41
ADLS_ACUITY_SCORE: 41

## 2025-08-21 ENCOUNTER — TELEPHONE (OUTPATIENT)
Dept: BEHAVIORAL HEALTH | Facility: CLINIC | Age: 25
End: 2025-08-21
Payer: COMMERCIAL

## 2025-08-24 ENCOUNTER — MYC MEDICAL ADVICE (OUTPATIENT)
Dept: AUDIOLOGY | Facility: CLINIC | Age: 25
End: 2025-08-24

## 2025-08-24 ENCOUNTER — APPOINTMENT (OUTPATIENT)
Dept: GENERAL RADIOLOGY | Facility: CLINIC | Age: 25
End: 2025-08-24
Attending: EMERGENCY MEDICINE
Payer: COMMERCIAL

## 2025-08-24 ENCOUNTER — HOSPITAL ENCOUNTER (EMERGENCY)
Facility: CLINIC | Age: 25
Discharge: HOME | End: 2025-08-24
Attending: EMERGENCY MEDICINE | Admitting: EMERGENCY MEDICINE
Payer: COMMERCIAL

## 2025-08-24 VITALS
BODY MASS INDEX: 29.03 KG/M2 | TEMPERATURE: 97.6 F | HEIGHT: 67 IN | RESPIRATION RATE: 18 BRPM | DIASTOLIC BLOOD PRESSURE: 81 MMHG | OXYGEN SATURATION: 100 % | HEART RATE: 130 BPM | SYSTOLIC BLOOD PRESSURE: 136 MMHG | WEIGHT: 185 LBS

## 2025-08-24 DIAGNOSIS — R07.0 THROAT DISCOMFORT: Primary | ICD-10-CM

## 2025-08-24 DIAGNOSIS — R06.02 SOB (SHORTNESS OF BREATH): ICD-10-CM

## 2025-08-24 LAB
ALBUMIN SERPL BCG-MCNC: 4.4 G/DL (ref 3.5–5.2)
ALP SERPL-CCNC: 85 U/L (ref 40–150)
ALT SERPL W P-5'-P-CCNC: 21 U/L (ref 0–70)
ANION GAP SERPL CALCULATED.3IONS-SCNC: 15 MMOL/L (ref 7–15)
AST SERPL W P-5'-P-CCNC: 18 U/L (ref 0–45)
ATRIAL RATE - MUSE: 128 BPM
BASOPHILS # BLD AUTO: <0.03 10E3/UL (ref 0–0.2)
BASOPHILS NFR BLD AUTO: 0.2 %
BILIRUB SERPL-MCNC: 0.3 MG/DL
BUN SERPL-MCNC: 7.3 MG/DL (ref 6–20)
CALCIUM SERPL-MCNC: 9.4 MG/DL (ref 8.8–10.4)
CHLORIDE SERPL-SCNC: 101 MMOL/L (ref 98–107)
CREAT SERPL-MCNC: 0.95 MG/DL (ref 0.67–1.17)
DIASTOLIC BLOOD PRESSURE - MUSE: NORMAL MMHG
EGFRCR SERPLBLD CKD-EPI 2021: >90 ML/MIN/1.73M2
EOSINOPHIL # BLD AUTO: <0.03 10E3/UL (ref 0–0.7)
EOSINOPHIL NFR BLD AUTO: 0 %
ERYTHROCYTE [DISTWIDTH] IN BLOOD BY AUTOMATED COUNT: 12.5 % (ref 10–15)
GLUCOSE SERPL-MCNC: 125 MG/DL (ref 70–99)
HCO3 SERPL-SCNC: 22 MMOL/L (ref 22–29)
HCT VFR BLD AUTO: 39.8 % (ref 40–53)
HGB BLD-MCNC: 14 G/DL (ref 13.3–17.7)
HOLD SPECIMEN: NORMAL
IMM GRANULOCYTES # BLD: 0.03 10E3/UL
IMM GRANULOCYTES NFR BLD: 0.3 %
INTERPRETATION ECG - MUSE: NORMAL
LYMPHOCYTES # BLD AUTO: 1.07 10E3/UL (ref 0.8–5.3)
LYMPHOCYTES NFR BLD AUTO: 11.4 %
MCH RBC QN AUTO: 30.4 PG (ref 26.5–33)
MCHC RBC AUTO-ENTMCNC: 35.2 G/DL (ref 31.5–36.5)
MCV RBC AUTO: 86.3 FL (ref 78–100)
MONOCYTES # BLD AUTO: 0.42 10E3/UL (ref 0–1.3)
MONOCYTES NFR BLD AUTO: 4.5 %
NEUTROPHILS # BLD AUTO: 7.84 10E3/UL (ref 1.6–8.3)
NEUTROPHILS NFR BLD AUTO: 83.6 %
NRBC # BLD AUTO: <0.03 10E3/UL
NRBC BLD AUTO-RTO: 0 /100
P AXIS - MUSE: 42 DEGREES
PLATELET # BLD AUTO: 263 10E3/UL (ref 150–450)
POTASSIUM SERPL-SCNC: 3.5 MMOL/L (ref 3.4–5.3)
PR INTERVAL - MUSE: 116 MS
PROT SERPL-MCNC: 7.1 G/DL (ref 6.4–8.3)
QRS DURATION - MUSE: 94 MS
QT - MUSE: 302 MS
QTC - MUSE: 440 MS
R AXIS - MUSE: 53 DEGREES
RBC # BLD AUTO: 4.61 10E6/UL (ref 4.4–5.9)
SODIUM SERPL-SCNC: 138 MMOL/L (ref 135–145)
SYSTOLIC BLOOD PRESSURE - MUSE: NORMAL MMHG
T AXIS - MUSE: 60 DEGREES
VENTRICULAR RATE- MUSE: 128 BPM
WBC # BLD AUTO: 9.38 10E3/UL (ref 4–11)

## 2025-08-24 PROCEDURE — 82040 ASSAY OF SERUM ALBUMIN: CPT | Performed by: EMERGENCY MEDICINE

## 2025-08-24 PROCEDURE — 99285 EMERGENCY DEPT VISIT HI MDM: CPT | Mod: 25 | Performed by: EMERGENCY MEDICINE

## 2025-08-24 PROCEDURE — 71046 X-RAY EXAM CHEST 2 VIEWS: CPT

## 2025-08-24 PROCEDURE — 85025 COMPLETE CBC W/AUTO DIFF WBC: CPT | Performed by: EMERGENCY MEDICINE

## 2025-08-24 PROCEDURE — 93005 ELECTROCARDIOGRAM TRACING: CPT

## 2025-08-24 PROCEDURE — 36415 COLL VENOUS BLD VENIPUNCTURE: CPT | Performed by: EMERGENCY MEDICINE

## 2025-08-24 PROCEDURE — 80053 COMPREHEN METABOLIC PANEL: CPT | Performed by: EMERGENCY MEDICINE

## 2025-08-24 RX ORDER — SUCRALFATE 1 G/1
1 TABLET ORAL 4 TIMES DAILY
Qty: 40 TABLET | Refills: 0 | Status: SHIPPED | OUTPATIENT
Start: 2025-08-24

## 2025-08-24 ASSESSMENT — ACTIVITIES OF DAILY LIVING (ADL)
ADLS_ACUITY_SCORE: 41